# Patient Record
Sex: FEMALE | Race: BLACK OR AFRICAN AMERICAN | NOT HISPANIC OR LATINO | Employment: FULL TIME | URBAN - NONMETROPOLITAN AREA
[De-identification: names, ages, dates, MRNs, and addresses within clinical notes are randomized per-mention and may not be internally consistent; named-entity substitution may affect disease eponyms.]

---

## 2024-08-06 ENCOUNTER — OFFICE VISIT (OUTPATIENT)
Dept: FAMILY MEDICINE | Facility: CLINIC | Age: 59
End: 2024-08-06
Payer: MEDICAID

## 2024-08-06 VITALS
HEIGHT: 63 IN | DIASTOLIC BLOOD PRESSURE: 88 MMHG | TEMPERATURE: 98 F | SYSTOLIC BLOOD PRESSURE: 170 MMHG | HEART RATE: 79 BPM | OXYGEN SATURATION: 98 % | BODY MASS INDEX: 29.41 KG/M2 | WEIGHT: 166 LBS

## 2024-08-06 DIAGNOSIS — J30.9 ALLERGIC RHINITIS, UNSPECIFIED SEASONALITY, UNSPECIFIED TRIGGER: ICD-10-CM

## 2024-08-06 DIAGNOSIS — H35.30 MACULAR DEGENERATION, UNSPECIFIED LATERALITY, UNSPECIFIED TYPE: ICD-10-CM

## 2024-08-06 DIAGNOSIS — L30.9 ECZEMA, UNSPECIFIED TYPE: ICD-10-CM

## 2024-08-06 DIAGNOSIS — E11.9 TYPE 2 DIABETES MELLITUS WITHOUT COMPLICATION, WITHOUT LONG-TERM CURRENT USE OF INSULIN: Primary | ICD-10-CM

## 2024-08-06 DIAGNOSIS — I10 PRIMARY HYPERTENSION: ICD-10-CM

## 2024-08-06 PROCEDURE — 3077F SYST BP >= 140 MM HG: CPT | Mod: CPTII,,, | Performed by: NURSE PRACTITIONER

## 2024-08-06 PROCEDURE — 1159F MED LIST DOCD IN RCRD: CPT | Mod: CPTII,,, | Performed by: NURSE PRACTITIONER

## 2024-08-06 PROCEDURE — 3008F BODY MASS INDEX DOCD: CPT | Mod: CPTII,,, | Performed by: NURSE PRACTITIONER

## 2024-08-06 PROCEDURE — 3079F DIAST BP 80-89 MM HG: CPT | Mod: CPTII,,, | Performed by: NURSE PRACTITIONER

## 2024-08-06 PROCEDURE — 99204 OFFICE O/P NEW MOD 45 MIN: CPT | Mod: ,,, | Performed by: NURSE PRACTITIONER

## 2024-08-06 PROCEDURE — 1160F RVW MEDS BY RX/DR IN RCRD: CPT | Mod: CPTII,,, | Performed by: NURSE PRACTITIONER

## 2024-08-06 RX ORDER — TRIAMCINOLONE ACETONIDE 1 MG/G
CREAM TOPICAL 2 TIMES DAILY
Qty: 30 G | Refills: 1 | Status: SHIPPED | OUTPATIENT
Start: 2024-08-06

## 2024-08-06 RX ORDER — LABETALOL 200 MG/1
TABLET, FILM COATED ORAL
COMMUNITY
Start: 2024-06-13

## 2024-08-06 RX ORDER — LANCETS
EACH MISCELLANEOUS
Qty: 100 EACH | Refills: 11 | Status: SHIPPED | OUTPATIENT
Start: 2024-08-06

## 2024-08-06 RX ORDER — IRBESARTAN AND HYDROCHLOROTHIAZIDE 150; 12.5 MG/1; MG/1
1 TABLET, FILM COATED ORAL DAILY
Qty: 90 TABLET | Refills: 3 | Status: SHIPPED | OUTPATIENT
Start: 2024-08-06 | End: 2025-08-06

## 2024-08-06 RX ORDER — LOSARTAN POTASSIUM AND HYDROCHLOROTHIAZIDE 25; 100 MG/1; MG/1
TABLET ORAL
COMMUNITY
Start: 2024-06-13 | End: 2024-08-06 | Stop reason: ALTCHOICE

## 2024-08-06 RX ORDER — INSULIN PUMP SYRINGE, 3 ML
EACH MISCELLANEOUS
Qty: 1 EACH | Refills: 0 | Status: SHIPPED | OUTPATIENT
Start: 2024-08-06

## 2024-08-06 RX ORDER — METFORMIN HYDROCHLORIDE 1000 MG/1
TABLET ORAL
COMMUNITY
Start: 2024-06-13 | End: 2024-08-06 | Stop reason: ALTCHOICE

## 2024-08-06 RX ORDER — FLUTICASONE PROPIONATE 50 MCG
1 SPRAY, SUSPENSION (ML) NASAL
COMMUNITY
Start: 2024-04-30

## 2024-08-06 RX ORDER — ROSUVASTATIN CALCIUM 10 MG/1
10 TABLET, COATED ORAL DAILY
Qty: 90 TABLET | Refills: 3 | Status: SHIPPED | OUTPATIENT
Start: 2024-08-06 | End: 2025-08-06

## 2024-08-06 RX ORDER — CETIRIZINE HYDROCHLORIDE 10 MG/1
10 TABLET ORAL DAILY
Qty: 90 TABLET | Refills: 3 | Status: SHIPPED | OUTPATIENT
Start: 2024-08-06

## 2024-08-09 LAB
LEFT EYE DM RETINOPATHY: POSITIVE
RIGHT EYE DM RETINOPATHY: POSITIVE

## 2024-08-20 ENCOUNTER — LAB VISIT (OUTPATIENT)
Dept: LAB | Facility: HOSPITAL | Age: 59
End: 2024-08-20
Attending: NURSE PRACTITIONER
Payer: MEDICAID

## 2024-08-20 DIAGNOSIS — E11.9 TYPE 2 DIABETES MELLITUS WITHOUT COMPLICATION, WITHOUT LONG-TERM CURRENT USE OF INSULIN: ICD-10-CM

## 2024-08-20 LAB
25(OH)D3+25(OH)D2 SERPL-MCNC: 22 NG/ML (ref 30–80)
ALBUMIN SERPL-MCNC: 4.3 G/DL (ref 3.4–5)
ALBUMIN/GLOB SERPL: 1.5 RATIO
ALP SERPL-CCNC: 76 UNIT/L (ref 50–144)
ALT SERPL-CCNC: 20 UNIT/L (ref 1–45)
ANION GAP SERPL CALC-SCNC: 9 MEQ/L (ref 2–13)
AST SERPL-CCNC: 23 UNIT/L (ref 14–36)
BASOPHILS # BLD AUTO: 0.02 X10(3)/MCL (ref 0.01–0.08)
BASOPHILS NFR BLD AUTO: 0.3 % (ref 0.1–1.2)
BILIRUB SERPL-MCNC: 0.4 MG/DL (ref 0–1)
BUN SERPL-MCNC: 18 MG/DL (ref 7–20)
CALCIUM SERPL-MCNC: 10 MG/DL (ref 8.4–10.2)
CHLORIDE SERPL-SCNC: 110 MMOL/L (ref 98–110)
CHOLEST SERPL-MCNC: 181 MG/DL (ref 0–200)
CO2 SERPL-SCNC: 24 MMOL/L (ref 21–32)
CREAT SERPL-MCNC: 1.19 MG/DL (ref 0.66–1.25)
CREAT/UREA NIT SERPL: 15 (ref 12–20)
EOSINOPHIL # BLD AUTO: 0.27 X10(3)/MCL (ref 0.04–0.36)
EOSINOPHIL NFR BLD AUTO: 4.1 % (ref 0.7–7)
ERYTHROCYTE [DISTWIDTH] IN BLOOD BY AUTOMATED COUNT: 12.1 % (ref 11–14.5)
EST. AVERAGE GLUCOSE BLD GHB EST-MCNC: 182.9 MG/DL (ref 70–115)
GFR SERPLBLD CREATININE-BSD FMLA CKD-EPI: 53 ML/MIN/1.73/M2
GLOBULIN SER-MCNC: 2.8 GM/DL (ref 2–3.9)
GLUCOSE SERPL-MCNC: 183 MG/DL (ref 70–115)
HBA1C MFR BLD: 8 % (ref 4–6)
HCT VFR BLD AUTO: 37 % (ref 36–48)
HCV AB SERPL QL IA: NONREACTIVE
HDLC SERPL-MCNC: 46 MG/DL (ref 40–60)
HGB BLD-MCNC: 13.4 G/DL (ref 11.8–16)
HIV 1+2 AB+HIV1 P24 AG SERPL QL IA: NONREACTIVE
IMM GRANULOCYTES # BLD AUTO: 0.02 X10(3)/MCL (ref 0–0.03)
IMM GRANULOCYTES NFR BLD AUTO: 0.3 % (ref 0–0.5)
LDLC SERPL DIRECT ASSAY-SCNC: 104.4 MG/DL (ref 30–100)
LYMPHOCYTES # BLD AUTO: 2.24 X10(3)/MCL (ref 1.16–3.74)
LYMPHOCYTES NFR BLD AUTO: 33.9 % (ref 20–55)
MCH RBC QN AUTO: 30.9 PG (ref 27–34)
MCHC RBC AUTO-ENTMCNC: 36.2 G/DL (ref 31–37)
MCV RBC AUTO: 85.5 FL (ref 79–99)
MONOCYTES # BLD AUTO: 0.59 X10(3)/MCL (ref 0.24–0.36)
MONOCYTES NFR BLD AUTO: 8.9 % (ref 4.7–12.5)
NEUTROPHILS # BLD AUTO: 3.46 X10(3)/MCL (ref 1.56–6.13)
NEUTROPHILS NFR BLD AUTO: 52.5 % (ref 37–73)
NRBC BLD AUTO-RTO: 0 %
PLATELET # BLD AUTO: 297 X10(3)/MCL (ref 140–371)
PMV BLD AUTO: 9.7 FL (ref 9.4–12.4)
POTASSIUM SERPL-SCNC: 3.6 MMOL/L (ref 3.5–5.1)
PROT SERPL-MCNC: 7.1 GM/DL (ref 6.3–8.2)
RBC # BLD AUTO: 4.33 X10(6)/MCL (ref 4–5.1)
SODIUM SERPL-SCNC: 143 MMOL/L (ref 136–145)
TRIGL SERPL-MCNC: 96 MG/DL (ref 30–200)
TSH SERPL-ACNC: 1.61 UIU/ML (ref 0.36–3.74)
WBC # BLD AUTO: 6.6 X10(3)/MCL (ref 4–11.5)

## 2024-08-20 PROCEDURE — 80061 LIPID PANEL: CPT

## 2024-08-20 PROCEDURE — 82306 VITAMIN D 25 HYDROXY: CPT

## 2024-08-20 PROCEDURE — 87389 HIV-1 AG W/HIV-1&-2 AB AG IA: CPT

## 2024-08-20 PROCEDURE — 36415 COLL VENOUS BLD VENIPUNCTURE: CPT

## 2024-08-20 PROCEDURE — 85025 COMPLETE CBC W/AUTO DIFF WBC: CPT

## 2024-08-20 PROCEDURE — 83036 HEMOGLOBIN GLYCOSYLATED A1C: CPT

## 2024-08-20 PROCEDURE — 80053 COMPREHEN METABOLIC PANEL: CPT

## 2024-08-20 PROCEDURE — 84443 ASSAY THYROID STIM HORMONE: CPT

## 2024-08-20 PROCEDURE — 86803 HEPATITIS C AB TEST: CPT

## 2024-08-30 ENCOUNTER — PATIENT OUTREACH (OUTPATIENT)
Facility: CLINIC | Age: 59
End: 2024-08-30
Payer: COMMERCIAL

## 2024-08-30 ENCOUNTER — OFFICE VISIT (OUTPATIENT)
Dept: FAMILY MEDICINE | Facility: CLINIC | Age: 59
End: 2024-08-30
Payer: MEDICAID

## 2024-08-30 VITALS
HEIGHT: 62 IN | DIASTOLIC BLOOD PRESSURE: 80 MMHG | OXYGEN SATURATION: 97 % | HEART RATE: 80 BPM | BODY MASS INDEX: 30.18 KG/M2 | SYSTOLIC BLOOD PRESSURE: 122 MMHG | TEMPERATURE: 98 F | WEIGHT: 164 LBS

## 2024-08-30 DIAGNOSIS — E11.9 TYPE 2 DIABETES MELLITUS WITHOUT COMPLICATION, WITHOUT LONG-TERM CURRENT USE OF INSULIN: Primary | ICD-10-CM

## 2024-08-30 DIAGNOSIS — Z12.4 SCREENING FOR CERVICAL CANCER: ICD-10-CM

## 2024-08-30 DIAGNOSIS — G47.00 INSOMNIA, UNSPECIFIED TYPE: ICD-10-CM

## 2024-08-30 DIAGNOSIS — E78.2 MIXED HYPERLIPIDEMIA: ICD-10-CM

## 2024-08-30 DIAGNOSIS — Z12.31 SCREENING MAMMOGRAM FOR BREAST CANCER: ICD-10-CM

## 2024-08-30 DIAGNOSIS — I10 PRIMARY HYPERTENSION: ICD-10-CM

## 2024-08-30 PROBLEM — E78.5 HYPERLIPIDEMIA: Status: ACTIVE | Noted: 2024-08-30

## 2024-08-30 LAB
CREAT UR-MCNC: 70.7 MG/DL (ref 45–106)
MICROALBUMIN UR-MCNC: 38.1 UG/ML
MICROALBUMIN/CREAT RATIO PNL UR: 53.9 MG/GM CR (ref 0–30)

## 2024-08-30 PROCEDURE — 82570 ASSAY OF URINE CREATININE: CPT | Performed by: NURSE PRACTITIONER

## 2024-08-30 RX ORDER — IRBESARTAN 150 MG/1
300 TABLET ORAL DAILY
Qty: 180 TABLET | Refills: 3 | Status: SHIPPED | OUTPATIENT
Start: 2024-08-30

## 2024-08-30 RX ORDER — TEMAZEPAM 15 MG/1
15 CAPSULE ORAL NIGHTLY PRN
Qty: 30 CAPSULE | Refills: 2 | Status: SHIPPED | OUTPATIENT
Start: 2024-08-30

## 2024-08-30 RX ORDER — SEMAGLUTIDE 1.34 MG/ML
INJECTION, SOLUTION SUBCUTANEOUS
Qty: 2 EACH | Refills: 0 | Status: SHIPPED | OUTPATIENT
Start: 2024-08-30 | End: 2025-09-27

## 2024-08-30 NOTE — ASSESSMENT & PLAN NOTE
Lab Results   Component Value Date    HGBA1C 8.0 (H) 08/20/2024    HGBA1C 7.5 (H) 09/22/2023    LDLDIRECT 104.4 (H) 08/20/2024    CREATININE 1.19 08/20/2024   Continue Jardiance 25 mg.  Start Ozempic 0.25 mg weekly, increase to 0.5 mg after 1 month.   Encouraged to follow ADA dietary guidelines for eating and implement exercise into daily regimen.  Encouraged compliance with both medications and diet to limit long term and negative effects from the disease.    Monitory glucose levels each morning and record.  Please bring for review at next appointment.  Repeat labs in 3 months return to clinic.

## 2024-08-30 NOTE — PROGRESS NOTES
Patient ID: [id number]     Chief Complaint: Hypertension (With labs)      HPI:     Ofelia Abad is a 58 y.o. female in the office for Hypertension (With labs)  Results reviewed and discussed.  Records not yet received from Cardiology or previous PCP for review. Blood sugars running high at home, over 200 fasting. Urinating a lot, can't make it to the bathroom in time since starting jardiance and hctz. Denies swelling.  No CP or SOB.       Past Medical History:  has a past medical history of Diabetes mellitus, type 2.    Social History:  reports that she has never smoked. She has never used smokeless tobacco.    Current Outpatient Medications   Medication Instructions    blood sugar diagnostic Strp To check BG 1 times daily, to use with insurance preferred meter    blood-glucose meter kit To check BG 1 times daily, to use with insurance preferred meter    cetirizine (ZYRTEC) 10 mg, Oral, Daily    empagliflozin (JARDIANCE) 25 mg, Oral, Daily    fluticasone propionate (FLONASE) 50 mcg/actuation nasal spray 1 spray, Each Nostril, As needed (PRN)    irbesartan (AVAPRO) 300 mg, Oral, Daily    labetaloL (NORMODYNE) 200 MG tablet     lancets Misc To check BG 1 times daily, to use with insurance preferred meter    rosuvastatin (CRESTOR) 10 mg, Oral, Daily    semaglutide (OZEMPIC) 0.25 mg or 0.5 mg(2 mg/1.5 mL) pen injector Inject 0.25 mg into the skin every 7 days for 28 days, THEN 0.5 mg every 7 days. Start with 0.25 mg dose once weekly x 1 month, then increase to 0.5 mg dose once weekly thereafter.    temazepam (RESTORIL) 15 mg, Oral, Nightly PRN    triamcinolone acetonide 0.1% (KENALOG) 0.1 % cream Topical (Top), 2 times daily       Patient has No Known Allergies.     Patient Care Team:  Dina Dexter FNP-C as PCP - General (Family Medicine)  No, Primary Doctor  Care, [facility]     Subjective     Review of Systems    See HPI     Objective     Visit Vitals  /80   Pulse 80   Temp 98.1 °F (36.7 °C)  "(Temporal)   Ht 5' 2" (1.575 m)   Wt 74.4 kg (164 lb)   SpO2 97%   BMI 30.00 kg/m²       Physical Exam  Vitals reviewed.   Constitutional:       General: She is not in acute distress.     Appearance: Normal appearance. She is not ill-appearing.   Neck:      Vascular: No carotid bruit.   Cardiovascular:      Rate and Rhythm: Normal rate and regular rhythm.      Heart sounds: Murmur heard.   Pulmonary:      Effort: Pulmonary effort is normal.      Breath sounds: Normal breath sounds.   Musculoskeletal:      Right lower leg: No edema.      Left lower leg: No edema.   Lymphadenopathy:      Cervical: No cervical adenopathy.   Skin:     General: Skin is warm and dry.      Capillary Refill: Capillary refill takes less than 2 seconds.   Neurological:      Mental Status: She is alert and oriented to person, place, and time. Mental status is at baseline.   Psychiatric:         Mood and Affect: Mood normal.         Behavior: Behavior normal.         Thought Content: Thought content normal.         Assessment & Plan:     1. Type 2 diabetes mellitus without complication, without long-term current use of insulin  Overview:  On jardiance 25 mg    Assessment & Plan:  Lab Results   Component Value Date    HGBA1C 8.0 (H) 08/20/2024    HGBA1C 7.5 (H) 09/22/2023    LDLDIRECT 104.4 (H) 08/20/2024    CREATININE 1.19 08/20/2024   Continue Jardiance 25 mg.  Start Ozempic 0.25 mg weekly, increase to 0.5 mg after 1 month.   Encouraged to follow ADA dietary guidelines for eating and implement exercise into daily regimen.  Encouraged compliance with both medications and diet to limit long term and negative effects from the disease.    Monitory glucose levels each morning and record.  Please bring for review at next appointment.  Repeat labs in 3 months return to clinic.         Orders:  -     Microalbumin/Creatinine Ratio, Urine  -     semaglutide (OZEMPIC) 0.25 mg or 0.5 mg(2 mg/1.5 mL) pen injector; Inject 0.25 mg into the skin every 7 days " for 28 days, THEN 0.5 mg every 7 days. Start with 0.25 mg dose once weekly x 1 month, then increase to 0.5 mg dose once weekly thereafter.  Dispense: 2 each; Refill: 0    2. Screening mammogram for breast cancer  -     Mammo Digital Screening Bilat w/ [facility]; Future; Expected date: 08/30/2024    3. Screening for cervical cancer  -     Ambulatory referral/consult to Gynecology; Future; Expected date: 09/06/2024    4. Primary hypertension  Overview:  On irbesartan/HCTZ    Assessment & Plan:  Blood pressure at goal, complaining of urinary frequency and muscle cramps.  Stop HCTZ.  Continue irbesartan 150 mg, if blood pressure consistently over 130/90, increase irbesartan to 300 mg a day and notify office.  Follow up in about 2 months    Orders:  -     irbesartan (AVAPRO) 150 MG tablet; Take 2 tablets (300 mg total) by mouth once daily.  Dispense: 180 tablet; Refill: 3    5. Insomnia, unspecified type  -     temazepam (RESTORIL) 15 mg Cap; Take 1 capsule (15 mg total) by mouth nightly as needed (sleep).  Dispense: 30 capsule; Refill: 2    6. Mixed hyperlipidemia  Overview:  On rosuvastatin 10 mg    Assessment & Plan:  Lab Results   Component Value Date    CHOL 181 08/20/2024    HDL 46 08/20/2024    LDLDIRECT 104.4 (H) 08/20/2024    TRIG 96 08/20/2024         LDL Goal: less than 70   Educated on dietary modifications. Follow a low cholesterol, low saturated fat diet with less that 200mg of cholesterol a day.  Avoid fried foods and high saturated fats.  Increase dietary fiber.  Regular exercise can reduce LDL (bad cholesterol) and raise HDL (good cholesterol). Encourage physical activity 5 times per week for 30 minutes per day.              Follow up for 1), 3 mo f/u, fasting labs prior, DM, 2) 2 mo DM no labs. In addition to their next scheduled appointment, the patient has also been instructed to follow up on as needed basis.     Future Appointments   Date Time Provider Department Center   11/8/2024 11:30 AM Dina Dexter  [facility]   12/13/2024 10:00 AM Dina Dexter FNP-C [facility]

## 2024-08-30 NOTE — PROGRESS NOTES
Health Maintenance Topic(s) Outreach Outcomes & Actions Taken:    Eye Exam - Outreach Outcomes & Actions Taken  : Hyperlinked eye exam from [facility]

## 2024-08-30 NOTE — ASSESSMENT & PLAN NOTE
Blood pressure at goal, complaining of urinary frequency and muscle cramps.  Stop HCTZ.  Continue irbesartan 150 mg, if blood pressure consistently over 130/90, increase irbesartan to 300 mg a day and notify office.  Follow up in about 2 months

## 2024-08-30 NOTE — ASSESSMENT & PLAN NOTE
Lab Results   Component Value Date    CHOL 181 08/20/2024    HDL 46 08/20/2024    LDLDIRECT 104.4 (H) 08/20/2024    TRIG 96 08/20/2024         LDL Goal: less than 70   Educated on dietary modifications. Follow a low cholesterol, low saturated fat diet with less that 200mg of cholesterol a day.  Avoid fried foods and high saturated fats.  Increase dietary fiber.  Regular exercise can reduce LDL (bad cholesterol) and raise HDL (good cholesterol). Encourage physical activity 5 times per week for 30 minutes per day.

## 2024-09-04 ENCOUNTER — HOSPITAL ENCOUNTER (OUTPATIENT)
Dept: RADIOLOGY | Facility: HOSPITAL | Age: 59
Discharge: HOME | End: 2024-09-04
Attending: NURSE PRACTITIONER
Payer: MEDICAID

## 2024-09-04 DIAGNOSIS — Z12.31 SCREENING MAMMOGRAM FOR BREAST CANCER: ICD-10-CM

## 2024-09-04 PROCEDURE — 77067 SCR MAMMO BI INCL CAD: CPT | Mod: TC

## 2024-09-12 ENCOUNTER — TELEPHONE (OUTPATIENT)
Dept: FAMILY MEDICINE | Facility: CLINIC | Age: 59
End: 2024-09-12
Payer: MEDICAID

## 2024-09-12 NOTE — TELEPHONE ENCOUNTER
----- Message from Edna Mehta LPN sent at 8/30/2024  9:09 AM CDT -----  Regarding: cardi  Call dr guardado and get visits [phone]

## 2024-09-12 NOTE — TELEPHONE ENCOUNTER
Spoke with his office all they have is an echo from aug, they never seen her in office, but they will send it .

## 2024-09-26 ENCOUNTER — OFFICE VISIT (OUTPATIENT)
Dept: OBSTETRICS AND GYNECOLOGY | Facility: CLINIC | Age: 59
End: 2024-09-26
Payer: MEDICAID

## 2024-09-26 ENCOUNTER — TELEPHONE (OUTPATIENT)
Dept: FAMILY MEDICINE | Facility: CLINIC | Age: 59
End: 2024-09-26
Payer: MEDICAID

## 2024-09-26 VITALS
SYSTOLIC BLOOD PRESSURE: 148 MMHG | WEIGHT: 160 LBS | DIASTOLIC BLOOD PRESSURE: 92 MMHG | BODY MASS INDEX: 29.44 KG/M2 | TEMPERATURE: 98 F | HEIGHT: 62 IN

## 2024-09-26 DIAGNOSIS — Z12.4 SCREENING FOR CERVICAL CANCER: ICD-10-CM

## 2024-09-26 PROCEDURE — 99499 UNLISTED E&M SERVICE: CPT | Mod: ,,, | Performed by: OBSTETRICS & GYNECOLOGY

## 2024-09-26 RX ORDER — KETOROLAC TROMETHAMINE 5 MG/ML
1 SOLUTION OPHTHALMIC 2 TIMES DAILY
COMMUNITY
Start: 2024-09-03

## 2024-09-26 NOTE — TELEPHONE ENCOUNTER
"Dina's note from 8/30/24 states "Continue irbesartan 150 mg, if blood pressure consistently over 130/90, increase irbesartan to 300 mg a day and notify office." I called pt. She said that she is taking 1 tablet and her bp Monday was 170/110 before she took it. After taking it, it was 142/90. I gave her Dina's instructions and advised her to monitor her bp every day for a couple of weeks, report if consistently greater than 130/90 or if it too low and she feels bad. She verbalized understanding.   "

## 2024-10-09 ENCOUNTER — TELEPHONE (OUTPATIENT)
Dept: FAMILY MEDICINE | Facility: CLINIC | Age: 59
End: 2024-10-09
Payer: MEDICAID

## 2024-10-09 DIAGNOSIS — E78.2 MIXED HYPERLIPIDEMIA: ICD-10-CM

## 2024-10-09 DIAGNOSIS — E11.9 TYPE 2 DIABETES MELLITUS WITHOUT COMPLICATION, WITHOUT LONG-TERM CURRENT USE OF INSULIN: Primary | ICD-10-CM

## 2024-10-09 DIAGNOSIS — I10 PRIMARY HYPERTENSION: ICD-10-CM

## 2024-10-09 PROCEDURE — 85025 COMPLETE CBC W/AUTO DIFF WBC: CPT | Performed by: NURSE PRACTITIONER

## 2024-10-09 PROCEDURE — 83036 HEMOGLOBIN GLYCOSYLATED A1C: CPT | Performed by: NURSE PRACTITIONER

## 2024-10-09 PROCEDURE — 80053 COMPREHEN METABOLIC PANEL: CPT | Performed by: NURSE PRACTITIONER

## 2024-10-09 PROCEDURE — 80061 LIPID PANEL: CPT | Performed by: NURSE PRACTITIONER

## 2024-10-09 NOTE — TELEPHONE ENCOUNTER
Spoke with pt. She will come in this afternoon to get labs done and will schedule appt for tomorrow.   [Normal] : normal gait, coordination grossly intact, no focal deficits

## 2024-10-09 NOTE — TELEPHONE ENCOUNTER
This is my last visit note: Fasting labs prior to follow up in about 2-3 weeks. She needs to be seen. If she can do labs today, I'll see her tomorrow.

## 2024-10-10 ENCOUNTER — OFFICE VISIT (OUTPATIENT)
Dept: FAMILY MEDICINE | Facility: CLINIC | Age: 59
End: 2024-10-10
Payer: MEDICAID

## 2024-10-10 ENCOUNTER — TELEPHONE (OUTPATIENT)
Dept: FAMILY MEDICINE | Facility: CLINIC | Age: 59
End: 2024-10-10

## 2024-10-10 VITALS
DIASTOLIC BLOOD PRESSURE: 84 MMHG | OXYGEN SATURATION: 98 % | SYSTOLIC BLOOD PRESSURE: 150 MMHG | HEART RATE: 81 BPM | HEIGHT: 62 IN | BODY MASS INDEX: 28.93 KG/M2 | WEIGHT: 157.19 LBS | TEMPERATURE: 97 F

## 2024-10-10 DIAGNOSIS — E11.9 TYPE 2 DIABETES MELLITUS WITHOUT COMPLICATION, WITHOUT LONG-TERM CURRENT USE OF INSULIN: Primary | ICD-10-CM

## 2024-10-10 DIAGNOSIS — E78.2 MIXED HYPERLIPIDEMIA: ICD-10-CM

## 2024-10-10 DIAGNOSIS — I10 PRIMARY HYPERTENSION: ICD-10-CM

## 2024-10-10 RX ORDER — AMLODIPINE BESYLATE 5 MG/1
5 TABLET ORAL DAILY
Qty: 90 TABLET | Refills: 3 | Status: SHIPPED | OUTPATIENT
Start: 2024-10-10 | End: 2025-10-10

## 2024-10-10 RX ORDER — SEMAGLUTIDE 0.68 MG/ML
0.5 INJECTION, SOLUTION SUBCUTANEOUS
Qty: 9 ML | Refills: 3 | Status: SHIPPED | OUTPATIENT
Start: 2024-10-10

## 2024-10-10 RX ORDER — ROSUVASTATIN CALCIUM 10 MG/1
10 TABLET, COATED ORAL NIGHTLY
Qty: 90 TABLET | Refills: 3 | Status: SHIPPED | OUTPATIENT
Start: 2024-10-10

## 2024-10-10 NOTE — ASSESSMENT & PLAN NOTE
Blood pressure not at goal.  Continue irbesartan 300 mg bid, losartan to 200 mg bid. Start amlodipine 5 mg.

## 2024-10-10 NOTE — PROGRESS NOTES
Patient ID: [id number]     Chief Complaint: Results (Labs results)      HPI:     Ofelia Abad is a 58 y.o. female here today for Results (Labs results)  Feels well, blood pressure is still high.  She's scheduled for pap with Dr. Duc ross.     Past Medical History:  has a past medical history of Diabetes mellitus, type 2.    Surgical History:  has a past surgical history that includes Breast biopsy (Right); Tumor removal (1978); Tubal ligation; and Tubal Reversal .    Family History: family history includes Breast cancer in her maternal aunt and maternal grandmother; Cervical cancer in her mother; Diabetes in her father and mother.    Social History:  reports that she has never smoked. She has never used smokeless tobacco. She reports that she does not currently use alcohol. She reports that she does not use drugs.    Current Outpatient Medications   Medication Instructions    amLODIPine (NORVASC) 5 mg, Oral, Daily    blood sugar diagnostic Strp To check BG 1 times daily, to use with insurance preferred meter    blood-glucose meter kit To check BG 1 times daily, to use with insurance preferred meter    cetirizine (ZYRTEC) 10 mg, Oral, Daily    empagliflozin (JARDIANCE) 25 mg, Oral, Daily    fluticasone propionate (FLONASE) 50 mcg/actuation nasal spray 1 spray, As needed (PRN)    irbesartan (AVAPRO) 300 mg, Oral, Daily    ketorolac 0.5% (ACULAR) 0.5 % Drop 1 drop, 2 times daily    labetaloL (NORMODYNE) 200 MG tablet     lancets Misc To check BG 1 times daily, to use with insurance preferred meter    OZEMPIC 0.5 mg, Subcutaneous, Every 7 days    rosuvastatin (CRESTOR) 10 mg, Oral, Nightly    temazepam (RESTORIL) 15 mg, Oral, Nightly PRN    triamcinolone acetonide 0.1% (KENALOG) 0.1 % cream Topical (Top), 2 times daily       Patient has No Known Allergies.     Patient Care Team:  Dina Dexter FNP-C as PCP - General (Family Medicine)  No, Primary Doctor  Care, [facility]       Subjective:     Review of  "Systems    See HPI     Objective:     Visit Vitals  BP (!) 150/84   Pulse 81   Temp 97.3 °F (36.3 °C) (Temporal)   Ht 5' 2.01" (1.575 m)   Wt 71.3 kg (157 lb 3.2 oz)   SpO2 98%   BMI 28.74 kg/m²       Physical Exam  Vitals reviewed.   Constitutional:       General: She is not in acute distress.  Cardiovascular:      Rate and Rhythm: Normal rate and regular rhythm.   Pulmonary:      Effort: Pulmonary effort is normal.      Breath sounds: Normal breath sounds.   Skin:     General: Skin is warm and dry.   Neurological:      Mental Status: She is alert and oriented to person, place, and time.   Psychiatric:         Mood and Affect: Mood normal.         Labs Reviewed:     Chemistry:  Lab Results   Component Value Date     10/09/2024    K 4.1 10/09/2024    BUN 21 (H) 10/09/2024    CREATININE 1.21 10/09/2024    EGFRNORACEVR 52 10/09/2024    GLUCOSE 112 10/09/2024    CALCIUM 10.2 10/09/2024    ALKPHOS 77 10/09/2024    LABPROT 7.5 10/09/2024    ALBUMIN 4.8 10/09/2024    AST 28 10/09/2024    ALT 21 10/09/2024    DEQCHESN33YA 22 (L) 08/20/2024    TSH 1.610 08/20/2024        Lab Results   Component Value Date    HGBA1C 7.4 (H) 10/09/2024        Hematology:  Lab Results   Component Value Date    WBC 6.63 10/09/2024    RBC 4.46 10/09/2024    HGB 13.7 10/09/2024    HCT 37.9 10/09/2024    MCV 85.0 10/09/2024    MCH 30.7 10/09/2024    MCHC 36.1 10/09/2024    RDW 12.0 10/09/2024     10/09/2024    MPV 9.8 10/09/2024       Lipid Panel:  Lab Results   Component Value Date    CHOL 161 10/09/2024    HDL 46 10/09/2024    LDLDIRECT 88.0 10/09/2024    TRIG 89 10/09/2024         Assessment & Plan:     1. Type 2 diabetes mellitus without complication, without long-term current use of insulin  Overview:  On jardiance 25 mg, ozempic 0.5 mg    Assessment & Plan:  Lab Results   Component Value Date    HGBA1C 7.4 (H) 10/09/2024      Continue medications without change.   Encouraged to follow ADA dietary guidelines for eating and " implement exercise into daily regimen.  Encouraged compliance with both medications and diet to limit long term and negative effects from the disease.    Monitory glucose levels each morning and record.  Please bring for review at next appointment.      Orders:  -     semaglutide (OZEMPIC) 0.25 mg or 0.5 mg (2 mg/3 mL) pen injector; Inject 0.5 mg into the skin every 7 days.  Dispense: 9 mL; Refill: 3  -     rosuvastatin (CRESTOR) 10 MG tablet; Take 1 tablet (10 mg total) by mouth every evening.  Dispense: 90 tablet; Refill: 3    2. Primary hypertension  Overview:  On irbesartan/HCTZ    Assessment & Plan:  Blood pressure not at goal.  Continue irbesartan 300 mg bid, losartan to 200 mg bid. Start amlodipine 5 mg.     Orders:  -     amLODIPine (NORVASC) 5 MG tablet; Take 1 tablet (5 mg total) by mouth once daily.  Dispense: 90 tablet; Refill: 3    3. Mixed hyperlipidemia  Overview:  On rosuvastatin 10 mg    Assessment & Plan:  LDL not to goal, increase rosuvastatin.    Orders:  -     rosuvastatin (CRESTOR) 10 MG tablet; Take 1 tablet (10 mg total) by mouth every evening.  Dispense: 90 tablet; Refill: 3       Follow up for 1) 1 mo f/u BP. In addition to their scheduled follow up, the patient has also been instructed to follow up on as needed basis.     Future Appointments   Date Time Provider Department Center   10/15/2024 10:30 AM Pinky Xavier MD [facility]   11/8/2024 11:30 AM Dina Dexter FNP-C [facility]   12/13/2024 10:00 AM Dina Dexter FNP-C [facility]

## 2024-10-10 NOTE — ASSESSMENT & PLAN NOTE
Lab Results   Component Value Date    HGBA1C 7.4 (H) 10/09/2024      Continue medications without change.   Encouraged to follow ADA dietary guidelines for eating and implement exercise into daily regimen.  Encouraged compliance with both medications and diet to limit long term and negative effects from the disease.    Monitory glucose levels each morning and record.  Please bring for review at next appointment.

## 2024-10-11 ENCOUNTER — TELEPHONE (OUTPATIENT)
Dept: FAMILY MEDICINE | Facility: CLINIC | Age: 59
End: 2024-10-11
Payer: MEDICAID

## 2024-10-11 DIAGNOSIS — I10 PRIMARY HYPERTENSION: Primary | ICD-10-CM

## 2024-10-14 RX ORDER — NIFEDIPINE 30 MG/1
30 TABLET, EXTENDED RELEASE ORAL DAILY
Qty: 30 TABLET | Refills: 11 | Status: SHIPPED | OUTPATIENT
Start: 2024-10-14 | End: 2025-10-14

## 2024-10-14 NOTE — TELEPHONE ENCOUNTER
The only thing that happened when she took amlodipine is that her ankles got swollen?  If yes, this is not an allergy.  Start nifedipine 30 mg daily.

## 2024-10-14 NOTE — PROGRESS NOTES
Chief Complaint:  Well Woman    History of Present Illness:  Ofelia Abad is a 58 y.o. year old  presents for her well woman exam. Menopausal, she denies any postmenopausal bleeding. C/o hot flashes, no improvement with fans or layered clothing.    PMH of hypertension. Currently taking Labetalol 200 mg. Recently started a new medication Friday, took first pill today.         Gyn History:  Menstrual History  Cycle: No  Menarche Age: 15 years  No Cycle Reason: Other  Other Reason: +Postmenopausal    Menopause  Menopause Age: 42 years  Post Menopausal Bleeding: No  Hormone Replacement Therapy: No    Pap History  Last pap date: 17  Result: Normal  History of Abnormal Pap: No  HPV Vaccine Completed: N/a    [place]  Sexually Active: No  STI History: No  Contraception: N/a    Breast History  Last Breast Imaging Date: Yes  Date: 24 (MMG - Benign)  History of Abnormal Breast Imaging : No  History of Breast Biopsy: Yes  Date of last biopsy:  ((R) Breast Bx)  Results of Last Biopsy: Yes ((R) Breast Bx - Benign)        Review of Systems:  General/Constitutional: Chills denies. Fatigue/weakness denies. Fever denies. Night sweats denies. Hot flashes admits    Respiratory: Cough denies. Hemoptysis denies. SOB denies. Sputum production denies. Wheezing denies .   Cardiovascular: Chest pain denies. Dizziness denies. Palpitations denies. Swelling in hands/feet denies.                Breast: Dimpling denies. Breast mass denies. Breast pain/tenderness denies. Nipple discharge denies. Puckering denies.  Gastrointestinal: Abdominal pain denies. Blood in stool denies. Constipation denies. Diarrhea denies. Heartburn denies. Nausea denies. Vomiting denies    Genitourinary: Incontinence denies. Blood in urine denies. Frequent urination denies. Painful urination denies. Urinary urgency denies. Nocturia denies    Gynecologic: Irregular menses denies. Heavy bleeding denies. Painful menses denies. Vaginal discharge  denies. Vaginal odor denies. Vaginal itching denies. Vaginal lesion denies. Pelvic pain denies. Decreased libido denies. Vulvar lesion denies. Prolapse of genital organs denies. Painful intercourse denies. Postcoital bleeding denies    Psychiatric: Depression denies. Anxiety denies.     OB History    Para Term  AB Living   2 2 2 0 0 2   SAB IAB Ectopic Multiple Live Births   0 0 0 0 2      # 1 - Date: [birth year]10[birth year], Sex: Female, Weight: 2.268 kg (5 lb), GA: 40w0d, Type: Vaginal, Spontaneous, Apgar1: None, Apgar5: None, Living: Living, Birth Comments: None    # 2 - Date: [birth year], Sex: Male, Weight: 2.268 kg (5 lb), GA: 40w0d, Type: Vaginal, Spontaneous, Apgar1: None, Apgar5: None, Living: Living, Birth Comments: None      Past Medical History:   Diagnosis Date    Diabetes mellitus, type 2     Fibroid     Heart murmur     Hyperlipidemia     Hypertension     Seasonal allergies     Unspecified macular degeneration        Current Outpatient Medications:     blood sugar diagnostic Strp, To check BG 1 times daily, to use with insurance preferred meter, Disp: 100 each, Rfl: 11    blood-glucose meter kit, To check BG 1 times daily, to use with insurance preferred meter, Disp: 1 each, Rfl: 0    cetirizine (ZYRTEC) 10 MG tablet, Take 1 tablet (10 mg total) by mouth once daily., Disp: 90 tablet, Rfl: 3    empagliflozin (JARDIANCE) 25 mg tablet, Take 1 tablet (25 mg total) by mouth once daily., Disp: 90 tablet, Rfl: 0    fluticasone propionate (FLONASE) 50 mcg/actuation nasal spray, 1 spray by Each Nostril route as needed., Disp: , Rfl:     irbesartan (AVAPRO) 150 MG tablet, Take 2 tablets (300 mg total) by mouth once daily., Disp: 180 tablet, Rfl: 3    ketorolac 0.5% (ACULAR) 0.5 % Drop, Place 1 drop into both eyes 2 (two) times daily., Disp: , Rfl:     labetaloL (NORMODYNE) 200 MG tablet, , Disp: , Rfl:     lancets [facility] [place] To check BG 1 times daily, to use with insurance preferred meter, Disp: 100 each,  Rfl: 11    NIFEdipine (ADALAT CC) 30 MG TbSR, Take 1 tablet (30 mg total) by mouth once daily., Disp: 30 tablet, Rfl: 11    rosuvastatin (CRESTOR) 10 MG tablet, Take 1 tablet (10 mg total) by mouth every evening., Disp: 90 tablet, Rfl: 3    semaglutide (OZEMPIC) 0.25 mg or 0.5 mg (2 mg/3 mL) pen injector, Inject 0.5 mg into the skin every 7 days., Disp: 9 mL, Rfl: 3    temazepam (RESTORIL) 15 mg Cap, Take 1 capsule (15 mg total) by mouth nightly as needed (sleep)., Disp: 30 capsule, Rfl: 2    triamcinolone acetonide 0.1% (KENALOG) 0.1 % cream, Apply topically 2 (two) times daily., Disp: 30 g, Rfl: 1    PARoxetine mesylate,menop.sym, (BRISDELLE) 7.5 mg Cap, Take 7.5 mg by mouth Daily., Disp: 30 capsule, Rfl: 1    Review of patient's allergies indicates:   Allergen Reactions    Norvasc [amlodipine] Swelling       Past Surgical History:   Procedure Laterality Date    ANTERIOR CRUCIATE LIGAMENT REPAIR Right 1989    BREAST BIOPSY Right 1981    BENIGN    TUBAL LIGATION      Tubal Reversal       TUMOR REMOVAL Right 1978    Breast     Family History   Problem Relation Name Age of Onset    Breast cancer Maternal Grandmother Alberta         40's    Diabetes Father Best     Hypertension Father Best     Ovarian cancer Mother Jamshid Kovacs         60's    Diabetes Mother Jamshid Kovacs     Cancer Mother Jamshid Kovacs     Thyroid disease Mother Jamshid Kovacs     Breast cancer Sister Pawel 44    Rashes / Skin problems Sister Pawel     Breast cancer Maternal Aunt Marcia         50's    Heart failure Maternal Aunt Marcia     Stroke Maternal Aunt Marcia     Colon cancer Neg Hx      Uterine cancer Neg Hx      Cervical cancer Neg Hx       Social History     Socioeconomic History    Marital status:    Tobacco Use    Smoking status: Never     Passive exposure: Never    Smokeless tobacco: Never   Substance and Sexual Activity    Alcohol use: Not Currently     Alcohol/week: 1.0 standard drink of alcohol      "Types: 1 Glasses of wine per week     Comment: Occasional    Drug use: Never    Sexual activity: Not Currently     Partners: Male     Birth control/protection: Abstinence, Post-menopausal     Comment: STI: None   Social History Narrative    ** Merged History Encounter **            Physical Exam:  BP (!) 178/84   Temp 97 °F (36.1 °C) (Temporal)   Ht 5' 1" (1.549 m)   Wt 71.2 kg (157 lb)   BMI 29.66 kg/m²     Chaperone: present.       General appearance: healthy, well-nourished and well-developed     Psychiatric: Orientation to time, place and person. Normal mood and affect and active, alert     Skin: Appearance: no rashes or lesions.     Neck:   Neck: supple, FROM, trachea midline. and no masses   Thyroid: no enlargement or nodules and non-tender.       Cardiovascular:   Auscultation: RRR and no murmur.   Peripheral Vascular: no varicosities, LLE edema, RLE edema, calf tenderness, and palpable cord and pedal pulses intact.     Lungs:   Respiratory effort: no intercostal retractions or accessory muscle usage.   Auscultation: no wheezing, rales/crackles, or rhonchi and clear to auscultation.     Breast:   Inspection/Palpation: no tenderness, discrete/distinct masses, skin changes, or abnormal secretions. Nipple appearance normal.     Abdomen:   Auscultation/Inspection/Palpation: no hepatomegaly, splenomegaly, masses, tenderness or CVA tenderness and soft, non-distended bowel sounds preset.    Hernia: no palpable hernias.     Female Genitalia:    Vulva: no masses, tenderness or lesions    Bladder/Urethra: no urethral discharge or mass, normal meatus, bladder non-distended.    Vagina: no tenderness, erythema, cystocele, rectocele, abnormal vaginal discharge or vesicle(s) or ulcers    Cervix: no discharge, no cervical lacerations noted or motion tenderness and grossly normal    Uterus: normal size and shape and midline, non-tender, and no uterine prolapse.    Adnexa/Parametria: no parametrial tenderness or mass, no " adnexal tenderness or ovarian mass.     Lymph Nodes:   Palpation: non tender submandibular nodes, axillary nodes, or inguinal nodes.     Rectal Exam:   Rectum: normal perianal skin.       Assessment/Plan:  1. Well woman exam  Pap   Recommend BSE monthly   Discussed recommendations of annual screening after age of 40 with mammogram  Explained that screening is not 100% reliable. Advised patient if she notices any changes to her breast including a lump, mass, dimpling, discharge, rash, or tenderness she should contact us immediately.     Healthy diet, exercise   MMG  Multivitamin   Seat belt   Sunscreen use   Safe sex/ STI education  Annual labs: w/ PCP Dina Dexter  Colonoscopy: cologuard 2024, negative    2. Hypertension  Educated  Currently taking Labetalol 200 and started new medication yesterday  ER precautions  Follow up with PCP    Vitals:    10/15/24 1104 10/15/24 1139   BP: (!) 170/80 (!) 178/84     3. Hot flashes  Patient with vasomotor symptoms of menopause that have not responded to conservative measures    Reviewed the physiologic roles of estrogen, progesterone and androgens in the female both positive and negative    Explained that with menopause comes a dramatic reduction in these hormones and that changes take place in their absence    Discussed symptoms of decreased hormone levels and that these symptoms usually last 6 months to 2 years    Reviewed hormone replacement options as well as indications and contraindications    Discussed the WHI study findings and current FDA recommendations. Also discussed current recommendations for breast screening       Discussed sexuality  Answered questions  Patient understands and wishes to  proceed with  Paroxetine 7.5 mg qd  Follow up 1mo          This note was transcribed by Cassia Edward MA. There may be transcription errors as a result, however minimal. I agree with transcription and every effort has been made to ensure accuracy of transcription, but any obvious  errors or omissions should be clarified with the author of the document.

## 2024-10-15 ENCOUNTER — TELEPHONE (OUTPATIENT)
Dept: OBSTETRICS AND GYNECOLOGY | Facility: CLINIC | Age: 59
End: 2024-10-15

## 2024-10-15 ENCOUNTER — OFFICE VISIT (OUTPATIENT)
Dept: OBSTETRICS AND GYNECOLOGY | Facility: CLINIC | Age: 59
End: 2024-10-15
Payer: MEDICAID

## 2024-10-15 VITALS
HEIGHT: 61 IN | WEIGHT: 157 LBS | SYSTOLIC BLOOD PRESSURE: 178 MMHG | TEMPERATURE: 97 F | DIASTOLIC BLOOD PRESSURE: 84 MMHG | BODY MASS INDEX: 29.64 KG/M2

## 2024-10-15 DIAGNOSIS — R23.2 HOT FLASHES: Primary | ICD-10-CM

## 2024-10-15 DIAGNOSIS — Z12.4 CERVICAL CANCER SCREENING: ICD-10-CM

## 2024-10-15 DIAGNOSIS — Z01.419 WELL WOMAN EXAM: Primary | ICD-10-CM

## 2024-10-15 DIAGNOSIS — I10 HYPERTENSION, UNSPECIFIED TYPE: ICD-10-CM

## 2024-10-15 DIAGNOSIS — R23.2 HOT FLASHES: ICD-10-CM

## 2024-10-15 PROCEDURE — 87624 HPV HI-RISK TYP POOLED RSLT: CPT | Performed by: OBSTETRICS & GYNECOLOGY

## 2024-10-15 RX ORDER — PAROXETINE 10 MG/1
10 TABLET, FILM COATED ORAL DAILY
Qty: 30 TABLET | Refills: 1 | Status: SHIPPED | OUTPATIENT
Start: 2024-10-15 | End: 2025-10-15

## 2024-10-15 RX ORDER — PAROXETINE 7.5 MG/1
7.5 CAPSULE ORAL DAILY
Qty: 30 CAPSULE | Refills: 1 | Status: SHIPPED | OUTPATIENT
Start: 2024-10-15 | End: 2024-10-15

## 2024-10-15 NOTE — TELEPHONE ENCOUNTER
----- Message from Elizabet sent at 10/15/2024  1:25 PM CDT -----  Regarding: Med change  Who Called:  [facility]  Best Call Back Number:  [phone]  Additional Information:  called in regards to meds not being covered under insurance for patient with that particular dx code-  Dr Xavier would have to lower the dosage in order for insurance to pay

## 2024-10-15 NOTE — TELEPHONE ENCOUNTER
Paroxetine 7.5 mg not on [address] program and is pretty expensive according to [facility] pharmacy.  Dr. Xavier notified.  Medication switched to Paroxetine 10 mg once daily and 1 refill.

## 2024-10-29 DIAGNOSIS — E11.9 TYPE 2 DIABETES MELLITUS WITHOUT COMPLICATION, WITHOUT LONG-TERM CURRENT USE OF INSULIN: ICD-10-CM

## 2024-10-29 RX ORDER — EMPAGLIFLOZIN 25 MG/1
25 TABLET, FILM COATED ORAL
Qty: 90 TABLET | Refills: 3 | Status: SHIPPED | OUTPATIENT
Start: 2024-10-29

## 2024-11-06 ENCOUNTER — OFFICE VISIT (OUTPATIENT)
Dept: FAMILY MEDICINE | Facility: CLINIC | Age: 59
End: 2024-11-06
Payer: MEDICAID

## 2024-11-06 VITALS
TEMPERATURE: 97 F | SYSTOLIC BLOOD PRESSURE: 132 MMHG | OXYGEN SATURATION: 98 % | WEIGHT: 155 LBS | HEIGHT: 61 IN | HEART RATE: 79 BPM | BODY MASS INDEX: 29.27 KG/M2 | DIASTOLIC BLOOD PRESSURE: 80 MMHG

## 2024-11-06 DIAGNOSIS — I10 PRIMARY HYPERTENSION: Primary | ICD-10-CM

## 2024-11-06 DIAGNOSIS — B37.31 CANDIDAL VAGINITIS: ICD-10-CM

## 2024-11-06 PROCEDURE — 1159F MED LIST DOCD IN RCRD: CPT | Mod: CPTII,,, | Performed by: NURSE PRACTITIONER

## 2024-11-06 PROCEDURE — 4010F ACE/ARB THERAPY RXD/TAKEN: CPT | Mod: CPTII,,, | Performed by: NURSE PRACTITIONER

## 2024-11-06 PROCEDURE — 3008F BODY MASS INDEX DOCD: CPT | Mod: CPTII,,, | Performed by: NURSE PRACTITIONER

## 2024-11-06 PROCEDURE — 3060F POS MICROALBUMINURIA REV: CPT | Mod: CPTII,,, | Performed by: NURSE PRACTITIONER

## 2024-11-06 PROCEDURE — 99214 OFFICE O/P EST MOD 30 MIN: CPT | Mod: ,,, | Performed by: NURSE PRACTITIONER

## 2024-11-06 PROCEDURE — 3066F NEPHROPATHY DOC TX: CPT | Mod: CPTII,,, | Performed by: NURSE PRACTITIONER

## 2024-11-06 PROCEDURE — 3075F SYST BP GE 130 - 139MM HG: CPT | Mod: CPTII,,, | Performed by: NURSE PRACTITIONER

## 2024-11-06 PROCEDURE — 3051F HG A1C>EQUAL 7.0%<8.0%: CPT | Mod: CPTII,,, | Performed by: NURSE PRACTITIONER

## 2024-11-06 PROCEDURE — 3079F DIAST BP 80-89 MM HG: CPT | Mod: CPTII,,, | Performed by: NURSE PRACTITIONER

## 2024-11-06 PROCEDURE — 1160F RVW MEDS BY RX/DR IN RCRD: CPT | Mod: CPTII,,, | Performed by: NURSE PRACTITIONER

## 2024-11-06 RX ORDER — FLUCONAZOLE 200 MG/1
200 TABLET ORAL DAILY
Qty: 1 TABLET | Refills: 0 | Status: SHIPPED | OUTPATIENT
Start: 2024-11-06 | End: 2024-11-07

## 2024-11-06 NOTE — PROGRESS NOTES
Patient ID: [id number]     Chief Complaint: Diabetes and Hypertension      HPI:     Ofelia Abad is a 58 y.o. female in the office for Diabetes and Hypertension  Feeling well, following up on blood pressure. Blood sugar between .    Past Medical History:  has a past medical history of Diabetes mellitus, type 2, Fibroid, Heart murmur, Hyperlipidemia, Hypertension, Seasonal allergies, and Unspecified macular degeneration.    Social History:  reports that she has never smoked. She has never been exposed to tobacco smoke. She has never used smokeless tobacco. She reports that she does not currently use alcohol after a past usage of about 1.0 standard drink of alcohol per week. She reports that she does not use drugs.    Current Outpatient Medications   Medication Instructions    blood sugar diagnostic Strp To check BG 1 times daily, to use with insurance preferred meter    blood-glucose meter kit To check BG 1 times daily, to use with insurance preferred meter    cetirizine (ZYRTEC) 10 mg, Oral, Daily    fluconazole (DIFLUCAN) 200 mg, Oral, Daily    fluticasone propionate (FLONASE) 50 mcg/actuation nasal spray 1 spray, As needed (PRN)    irbesartan (AVAPRO) 300 mg, Oral, Daily    JARDIANCE 25 mg, Oral    ketorolac 0.5% (ACULAR) 0.5 % Drop 1 drop, 2 times daily    labetaloL (NORMODYNE) 200 MG tablet     lancets Misc To check BG 1 times daily, to use with insurance preferred meter    NIFEdipine (ADALAT CC) 30 mg, Oral, Daily    OZEMPIC 0.5 mg, Subcutaneous, Every 7 days    paroxetine (PAXIL) 10 mg, Oral, Daily    rosuvastatin (CRESTOR) 10 mg, Oral, Nightly    temazepam (RESTORIL) 15 mg, Oral, Nightly PRN    triamcinolone acetonide 0.1% (KENALOG) 0.1 % cream Topical (Top), 2 times daily       Patient is allergic to norvasc [amlodipine].     Patient Care Team:  Dina Dexter FNP-C as PCP - General (Family Medicine)  [facility], [facility]  Pinky Xavier MD as Consulting Physician (Obstetrics and Gynecology)  "    Subjective     Review of Systems    See HPI     Objective     Visit Vitals  /80 (BP Location: Left arm)   Pulse 79   Temp 97.2 °F (36.2 °C) (Temporal)   Ht 5' 0.98" (1.549 m)   Wt 70.3 kg (155 lb)   SpO2 98%   BMI 29.30 kg/m²       Physical Exam  Vitals reviewed.   Constitutional:       General: She is not in acute distress.  Cardiovascular:      Rate and Rhythm: Normal rate and regular rhythm.   Pulmonary:      Effort: Pulmonary effort is normal.      Breath sounds: Normal breath sounds.   Skin:     General: Skin is warm and dry.   Neurological:      Mental Status: She is alert and oriented to person, place, and time.   Psychiatric:         Mood and Affect: Mood normal.         Assessment & Plan:     1. Primary hypertension  Overview:  On irbesartan/HCTZ, metoprolol 200 bid, amlodipine 5 mg      2. Candidal vaginitis  Comments:  educated on use of wipes and barrier cream    Other orders  -     fluconazole (DIFLUCAN) 200 MG Tab; Take 1 tablet (200 mg total) by mouth once daily. for 1 day  Dispense: 1 tablet; Refill: 0       Follow up in about 3 months (around 2/6/2025) for BP, DM,non fasting labs prior. In addition to their next scheduled appointment, the patient has also been instructed to follow up on as needed basis.     Future Appointments   Date Time Provider Department Center   11/13/2024 10:10 AM Pinky Xavier MD [facility]   2/4/2025  9:30 AM LAB, [facility] LABORATORY DRAW STATION [facility] MICKEY Mcmillan   2/12/2025  8:30 AM Dina Dexter FNP-C [facility] GIOVANNA Mcmillan      "

## 2024-11-15 ENCOUNTER — TELEPHONE (OUTPATIENT)
Dept: FAMILY MEDICINE | Facility: CLINIC | Age: 59
End: 2024-11-15
Payer: MEDICAID

## 2024-11-15 DIAGNOSIS — I10 PRIMARY HYPERTENSION: ICD-10-CM

## 2024-11-15 RX ORDER — LABETALOL 200 MG/1
200 TABLET, FILM COATED ORAL 2 TIMES DAILY
Qty: 90 TABLET | Refills: 1 | Status: SHIPPED | OUTPATIENT
Start: 2024-11-15

## 2025-01-13 DIAGNOSIS — E11.9 TYPE 2 DIABETES MELLITUS WITHOUT COMPLICATION, WITHOUT LONG-TERM CURRENT USE OF INSULIN: Primary | ICD-10-CM

## 2025-02-04 PROCEDURE — 85025 COMPLETE CBC W/AUTO DIFF WBC: CPT | Performed by: NURSE PRACTITIONER

## 2025-02-04 PROCEDURE — 80053 COMPREHEN METABOLIC PANEL: CPT | Performed by: NURSE PRACTITIONER

## 2025-02-04 PROCEDURE — 83036 HEMOGLOBIN GLYCOSYLATED A1C: CPT | Performed by: NURSE PRACTITIONER

## 2025-02-07 DIAGNOSIS — I10 PRIMARY HYPERTENSION: ICD-10-CM

## 2025-02-07 RX ORDER — LABETALOL 200 MG/1
200 TABLET, FILM COATED ORAL 2 TIMES DAILY
Qty: 90 TABLET | Refills: 0 | Status: SHIPPED | OUTPATIENT
Start: 2025-02-07

## 2025-02-12 ENCOUNTER — OFFICE VISIT (OUTPATIENT)
Dept: FAMILY MEDICINE | Facility: CLINIC | Age: 60
End: 2025-02-12
Payer: MEDICAID

## 2025-02-12 VITALS
TEMPERATURE: 97 F | BODY MASS INDEX: 27.56 KG/M2 | OXYGEN SATURATION: 97 % | HEART RATE: 85 BPM | WEIGHT: 146 LBS | SYSTOLIC BLOOD PRESSURE: 138 MMHG | HEIGHT: 61 IN | DIASTOLIC BLOOD PRESSURE: 84 MMHG

## 2025-02-12 DIAGNOSIS — L60.0 INGROWING TOENAIL: ICD-10-CM

## 2025-02-12 DIAGNOSIS — E11.9 TYPE 2 DIABETES MELLITUS WITHOUT COMPLICATION, WITHOUT LONG-TERM CURRENT USE OF INSULIN: ICD-10-CM

## 2025-02-12 DIAGNOSIS — L30.9 ECZEMA, UNSPECIFIED TYPE: ICD-10-CM

## 2025-02-12 DIAGNOSIS — E87.6 HYPOKALEMIA: ICD-10-CM

## 2025-02-12 DIAGNOSIS — I10 PRIMARY HYPERTENSION: Primary | ICD-10-CM

## 2025-02-12 PROCEDURE — 99214 OFFICE O/P EST MOD 30 MIN: CPT | Mod: ,,, | Performed by: NURSE PRACTITIONER

## 2025-02-12 PROCEDURE — G2211 COMPLEX E/M VISIT ADD ON: HCPCS | Mod: ,,, | Performed by: NURSE PRACTITIONER

## 2025-02-12 PROCEDURE — 4010F ACE/ARB THERAPY RXD/TAKEN: CPT | Mod: CPTII,,, | Performed by: NURSE PRACTITIONER

## 2025-02-12 PROCEDURE — 1160F RVW MEDS BY RX/DR IN RCRD: CPT | Mod: CPTII,,, | Performed by: NURSE PRACTITIONER

## 2025-02-12 PROCEDURE — 3008F BODY MASS INDEX DOCD: CPT | Mod: CPTII,,, | Performed by: NURSE PRACTITIONER

## 2025-02-12 PROCEDURE — 3079F DIAST BP 80-89 MM HG: CPT | Mod: CPTII,,, | Performed by: NURSE PRACTITIONER

## 2025-02-12 PROCEDURE — 1159F MED LIST DOCD IN RCRD: CPT | Mod: CPTII,,, | Performed by: NURSE PRACTITIONER

## 2025-02-12 PROCEDURE — 3044F HG A1C LEVEL LT 7.0%: CPT | Mod: CPTII,,, | Performed by: NURSE PRACTITIONER

## 2025-02-12 PROCEDURE — 3075F SYST BP GE 130 - 139MM HG: CPT | Mod: CPTII,,, | Performed by: NURSE PRACTITIONER

## 2025-02-12 RX ORDER — POTASSIUM CHLORIDE 20 MEQ/1
20 TABLET, EXTENDED RELEASE ORAL DAILY
Qty: 30 TABLET | Refills: 0 | Status: SHIPPED | OUTPATIENT
Start: 2025-02-12

## 2025-02-12 RX ORDER — BETAMETHASONE VALERATE 1 MG/G
CREAM TOPICAL 2 TIMES DAILY
Qty: 15 G | Refills: 1 | Status: SHIPPED | OUTPATIENT
Start: 2025-02-12

## 2025-02-12 NOTE — ASSESSMENT & PLAN NOTE
Triamcinolone not effective in controlling exacerbations.  We will increase the potency steroid cream.  RTC SWOP

## 2025-02-12 NOTE — ASSESSMENT & PLAN NOTE
Lab Results   Component Value Date    HGBA1C 6.2 (H) 02/04/2025   Continue medication.   Follow ADA Diet. Avoid soda, simple sweets, and limit rice/pasta/breads/starches (no more than 45-50 grams per meal).  Maintain healthy weight with goal BMI <30.  Exercise 5 times per week for 30 minutes per day.  Stressed importance of daily foot exams.  Stressed importance of annual dilated eye exam.

## 2025-02-12 NOTE — PROGRESS NOTES
Patient ID: [id number]     Chief Complaint: lab follow up      HPI:     Ofelia Abad is a 59 y.o. female here today for lab follow up      HPI    Past Medical History:  has a past medical history of Diabetes mellitus, type 2, Fibroid, Heart murmur, Hyperlipidemia, Hypertension, Seasonal allergies, and Unspecified macular degeneration.    Surgical History:  has a past surgical history that includes Breast biopsy (Right, 1981); Tumor removal (Right, 1978); Tubal ligation; Tubal Reversal ; and Anterior cruciate ligament repair (Right, 1989).    Family History: family history includes Breast cancer in her maternal aunt and maternal grandmother; Breast cancer (age of onset: 44) in her sister; Cancer in her mother; Diabetes in her father and mother; Heart failure in her maternal aunt; Hypertension in her father; Ovarian cancer in her mother; Rashes / Skin problems in her sister; Stroke in her maternal aunt; Thyroid disease in her mother.    Social History:  reports that she has never smoked. She has never been exposed to tobacco smoke. She has never used smokeless tobacco. She reports that she does not currently use alcohol after a past usage of about 1.0 standard drink of alcohol per week. She reports that she does not use drugs.    Current Outpatient Medications   Medication Instructions    betamethasone valerate 0.1% (VALISONE) 0.1 % Crea Topical (Top), 2 times daily    blood sugar diagnostic Strp To check BG 1 times daily, to use with insurance preferred meter    blood-glucose meter kit To check BG 1 times daily, to use with insurance preferred meter    cetirizine (ZYRTEC) 10 mg, Oral, Daily    fluticasone propionate (FLONASE) 50 mcg/actuation nasal spray 1 spray, As needed (PRN)    irbesartan (AVAPRO) 300 mg, Oral, Daily    JARDIANCE 25 mg, Oral    ketorolac 0.5% (ACULAR) 0.5 % Drop 1 drop, 2 times daily    labetaloL (NORMODYNE) 200 mg, Oral, 2 times daily    lancets Misc To check BG 1 times daily, to use with  "insurance preferred meter    NIFEdipine (ADALAT CC) 30 mg, Oral, Daily    OZEMPIC 0.5 mg, Subcutaneous, Every 7 days    potassium chloride SA (K-DUR,KLOR-CON) 20 MEQ tablet 20 mEq, Oral, Daily    rosuvastatin (CRESTOR) 10 mg, Oral, Nightly    temazepam (RESTORIL) 15 mg, Oral, Nightly PRN       Patient is allergic to norvasc [amlodipine].     Patient Care Team:  Dina Dexter FNP-C as PCP - General (Family Medicine)  [facility], [facility]  Pinky Xavier MD as Consulting Physician (Obstetrics and Gynecology)       Subjective:     Review of Systems    See HPI     Objective:     Visit Vitals  /84 (BP Location: Right arm)   Pulse 85   Temp 96.8 °F (36 °C) (Temporal)   Ht 5' 0.98" (1.549 m)   Wt 66.2 kg (146 lb)   SpO2 97%   BMI 27.60 kg/m²       Physical Exam    Labs Reviewed:     Chemistry:  Lab Results   Component Value Date     02/04/2025    K 3.4 (L) 02/04/2025    BUN 25 (H) 02/04/2025    CREATININE 1.10 02/04/2025    EGFRNORACEVR 58 02/04/2025    GLUCOSE 100 02/04/2025    CALCIUM 9.2 02/04/2025    ALKPHOS 92 02/04/2025    LABPROT 7.0 02/04/2025    ALBUMIN 4.4 02/04/2025    AST 29 02/04/2025    ALT 18 02/04/2025    FZFWLTZA14AS 22 (L) 08/20/2024    TSH 1.610 08/20/2024        Lab Results   Component Value Date    HGBA1C 6.2 (H) 02/04/2025        Hematology:  Lab Results   Component Value Date    WBC 7.33 02/04/2025    RBC 4.43 02/04/2025    HGB 13.4 02/04/2025    HCT 37.4 02/04/2025    MCV 84.4 02/04/2025    MCH 30.2 02/04/2025    MCHC 35.8 02/04/2025    RDW 12.2 02/04/2025     02/04/2025    MPV 9.9 02/04/2025       Lipid Panel:  Lab Results   Component Value Date    CHOL 161 10/09/2024    HDL 46 10/09/2024    LDLDIRECT 88.0 10/09/2024    TRIG 89 10/09/2024         Assessment & Plan:     1. Primary hypertension  Overview:  On irbesartan/HCTZ, metoprolol 200 bid, nifedipine 30 mg    Assessment & Plan:  Well controlled.   Continue current meds  Low Sodium Diet (DASH Diet - Less than 2 grams " of sodium per day).  Monitor blood pressure daily and log. Report consistent numbers greater than 140/90.  Maintain healthy weight with goal BMI <30. Exercise 30 minutes per day, 5 days per week.  Smoking cessation encouraged to aid in BP reduction.      Orders:  -     CBC Auto Differential; Future; Expected date: 06/12/2025  -     Comprehensive Metabolic Panel; Future; Expected date: 06/12/2025  -     Lipid Panel; Future; Expected date: 06/12/2025  -     Hemoglobin A1C; Future; Expected date: 06/12/2025  -     TSH; Future; Expected date: 06/12/2025    2. Type 2 diabetes mellitus without complication, without long-term current use of insulin  Overview:  On jardiance 25 mg, ozempic 0.5 mg    Assessment & Plan:  Lab Results   Component Value Date    HGBA1C 6.2 (H) 02/04/2025   Continue medication.   Follow ADA Diet. Avoid soda, simple sweets, and limit rice/pasta/breads/starches (no more than 45-50 grams per meal).  Maintain healthy weight with goal BMI <30.  Exercise 5 times per week for 30 minutes per day.  Stressed importance of daily foot exams.  Stressed importance of annual dilated eye exam.        3. Ingrowing toenail  -     Ambulatory referral/consult to Podiatry; Future; Expected date: 02/19/2025    4. Eczema, unspecified type  Assessment & Plan:  Triamcinolone not effective in controlling exacerbations.  We will increase the potency steroid cream.  RTC SWOP    Orders:  -     betamethasone valerate 0.1% (VALISONE) 0.1 % Crea; Apply topically 2 (two) times daily.  Dispense: 15 g; Refill: 1    5. Hypokalemia  Comments:  start KDur 20 meq.  Repeat BMP in 1-2 weeks.  Orders:  -     potassium chloride SA (K-DUR,KLOR-CON) 20 MEQ tablet; Take 1 tablet (20 mEq total) by mouth once daily.  Dispense: 30 tablet; Refill: 0  -     Basic Metabolic Panel; Future; Expected date: 02/12/2025       Follow up in about 4 months (around 6/12/2025) for Wellness, fasting labs prior, 2 weeks not fast labs . In addition to their  scheduled follow up, the patient has also been instructed to follow up on as needed basis.     Future Appointments   Date Time Provider Department Center   6/18/2025  8:40 AM LAB, [facility] LABORATORY DRAW STATION JONA MICKEY Mcmillan   6/24/2025  8:00 AM Dina Dexter, [facility] GIOVANNA Mcmillan

## 2025-02-19 ENCOUNTER — LAB VISIT (OUTPATIENT)
Dept: LAB | Facility: HOSPITAL | Age: 60
End: 2025-02-19
Payer: MEDICAID

## 2025-02-19 DIAGNOSIS — M79.674 PAIN IN TOE OF RIGHT FOOT: ICD-10-CM

## 2025-02-19 DIAGNOSIS — B35.1 DERMATOPHYTOSIS OF NAIL: ICD-10-CM

## 2025-02-19 DIAGNOSIS — M79.675 PAIN IN TOE OF LEFT FOOT: ICD-10-CM

## 2025-02-19 DIAGNOSIS — L60.0 INGROWING NAIL: Primary | ICD-10-CM

## 2025-02-19 LAB
ALBUMIN SERPL-MCNC: 4.6 G/DL (ref 3.4–5)
ALBUMIN/GLOB SERPL: 1.6 RATIO
ALP SERPL-CCNC: 82 UNIT/L (ref 50–144)
ALT SERPL-CCNC: 20 UNIT/L (ref 1–45)
AST SERPL-CCNC: 27 UNIT/L (ref 14–36)
BILIRUB SERPL-MCNC: 0.6 MG/DL (ref 0–1)
BILIRUBIN DIRECT+TOT PNL SERPL-MCNC: 0.3 MG/DL (ref 0–0.3)
GLOBULIN SER-MCNC: 2.8 GM/DL (ref 2–3.9)
PROT SERPL-MCNC: 7.4 GM/DL (ref 6.3–8.2)

## 2025-02-19 PROCEDURE — 80076 HEPATIC FUNCTION PANEL: CPT

## 2025-02-19 PROCEDURE — 36415 COLL VENOUS BLD VENIPUNCTURE: CPT

## 2025-03-21 DIAGNOSIS — I10 PRIMARY HYPERTENSION: ICD-10-CM

## 2025-03-21 RX ORDER — LABETALOL 200 MG/1
200 TABLET, FILM COATED ORAL 2 TIMES DAILY
Qty: 90 TABLET | Refills: 0 | Status: SHIPPED | OUTPATIENT
Start: 2025-03-21

## 2025-04-15 ENCOUNTER — RESULTS FOLLOW-UP (OUTPATIENT)
Dept: FAMILY MEDICINE | Facility: CLINIC | Age: 60
End: 2025-04-15

## 2025-04-15 ENCOUNTER — TELEPHONE (OUTPATIENT)
Dept: FAMILY MEDICINE | Facility: CLINIC | Age: 60
End: 2025-04-15

## 2025-04-15 ENCOUNTER — OFFICE VISIT (OUTPATIENT)
Dept: FAMILY MEDICINE | Facility: CLINIC | Age: 60
End: 2025-04-15
Payer: MEDICAID

## 2025-04-15 VITALS
WEIGHT: 146.19 LBS | HEIGHT: 61 IN | OXYGEN SATURATION: 98 % | SYSTOLIC BLOOD PRESSURE: 174 MMHG | TEMPERATURE: 97 F | DIASTOLIC BLOOD PRESSURE: 86 MMHG | BODY MASS INDEX: 27.6 KG/M2 | HEART RATE: 77 BPM

## 2025-04-15 DIAGNOSIS — I10 PRIMARY HYPERTENSION: Primary | ICD-10-CM

## 2025-04-15 DIAGNOSIS — E87.6 HYPOKALEMIA: ICD-10-CM

## 2025-04-15 DIAGNOSIS — R60.0 LOCALIZED EDEMA: ICD-10-CM

## 2025-04-15 LAB
ANION GAP SERPL CALC-SCNC: 7 MEQ/L (ref 2–13)
BUN SERPL-MCNC: 16 MG/DL (ref 7–20)
CALCIUM SERPL-MCNC: 9.7 MG/DL (ref 8.4–10.2)
CHLORIDE SERPL-SCNC: 109 MMOL/L (ref 98–110)
CO2 SERPL-SCNC: 24 MMOL/L (ref 21–32)
CREAT SERPL-MCNC: 1.11 MG/DL (ref 0.66–1.25)
CREAT/UREA NIT SERPL: 14 (ref 12–20)
GFR SERPLBLD CREATININE-BSD FMLA CKD-EPI: 57 ML/MIN/1.73/M2
GLUCOSE SERPL-MCNC: 127 MG/DL (ref 70–115)
POTASSIUM SERPL-SCNC: 4 MMOL/L (ref 3.5–5.1)
SODIUM SERPL-SCNC: 140 MMOL/L (ref 136–145)

## 2025-04-15 PROCEDURE — 3008F BODY MASS INDEX DOCD: CPT | Mod: CPTII,,, | Performed by: NURSE PRACTITIONER

## 2025-04-15 PROCEDURE — 3077F SYST BP >= 140 MM HG: CPT | Mod: CPTII,,, | Performed by: NURSE PRACTITIONER

## 2025-04-15 PROCEDURE — 4010F ACE/ARB THERAPY RXD/TAKEN: CPT | Mod: CPTII,,, | Performed by: NURSE PRACTITIONER

## 2025-04-15 PROCEDURE — 1160F RVW MEDS BY RX/DR IN RCRD: CPT | Mod: CPTII,,, | Performed by: NURSE PRACTITIONER

## 2025-04-15 PROCEDURE — 99214 OFFICE O/P EST MOD 30 MIN: CPT | Mod: ,,, | Performed by: NURSE PRACTITIONER

## 2025-04-15 PROCEDURE — 1159F MED LIST DOCD IN RCRD: CPT | Mod: CPTII,,, | Performed by: NURSE PRACTITIONER

## 2025-04-15 PROCEDURE — 3079F DIAST BP 80-89 MM HG: CPT | Mod: CPTII,,, | Performed by: NURSE PRACTITIONER

## 2025-04-15 PROCEDURE — 3044F HG A1C LEVEL LT 7.0%: CPT | Mod: CPTII,,, | Performed by: NURSE PRACTITIONER

## 2025-04-15 PROCEDURE — 80048 BASIC METABOLIC PNL TOTAL CA: CPT | Performed by: NURSE PRACTITIONER

## 2025-04-15 RX ORDER — SPIRONOLACTONE 25 MG/1
25 TABLET ORAL DAILY
Qty: 30 TABLET | Refills: 0 | Status: SHIPPED | OUTPATIENT
Start: 2025-04-15

## 2025-04-15 NOTE — PROGRESS NOTES
Patient ID: Ofelia Abad  : 1965    Chief Complaint: Foot Swelling and Headache    Allergies: Patient is allergic to norvasc [amlodipine].     Subjective :  The patient is a 59 y.o. Black or  female who presents to clinic for follow up on Foot Swelling and Headache   History of Present Illness    HPI:  Patient reports elevated blood pressure, confirmed by a recent measurement of 170/98. She has noticed swelling in her feet and ankles for about the last week, which is unusual for her. She reports feeling unwell yesterday, staying in bed all day, and having a headache. She denies any changes in her diet, stating that she monitors her salt intake carefully. Patient takes 3 blood pressure medications: nifedipine 30mg, labetalol 200mg twice daily, and irbesartan. Her medication was changed from including hydrochlorothiazide (HCTZ) to plain irbesartan in August of last year by her primary provider. She was prescribed potassium supplements in February due to low potassium levels. She works long hours, from 5 AM to 4 PM, which may be contributing to her symptoms. The swelling is worse when she goes to work and improves when she stays off her feet, as she did yesterday.    MEDICATIONS:  Patient is on Nifedipine 30 mg, Labetalol 200 mg twice daily, and Irbesartan for blood pressure management. She started a potassium supplement in February due to low potassium levels. The HCTZ (hydrochlorothiazide) component of Irbesartan HCTZ was discontinued in August of last year due to frequent urination. At some point in the past, her medication was changed from metoprolol to labetalol.    MEDICAL HISTORY:  Patient has a history of hypertension.    SURGICAL HISTORY:  Patient underwent an ingrown toenail removal procedure in February.    TEST RESULTS:  In February, the patient's potassium levels were found to be low, prompting the initiation of a potassium supplement.      ROS:  ROS as indicated in HPI.      "      Social History:  reports that she has never smoked. She has never been exposed to tobacco smoke. She has never used smokeless tobacco. She reports that she does not currently use alcohol after a past usage of about 1.0 standard drink of alcohol per week. She reports that she does not use drugs.    Past Medical History:  has a past medical history of Diabetes mellitus, type 2, Fibroid, Heart murmur, Hyperlipidemia, Hypertension, Seasonal allergies, and Unspecified macular degeneration.    Current Medications:  Current Outpatient Medications   Medication Instructions    betamethasone valerate 0.1% (VALISONE) 0.1 % Crea Topical (Top), 2 times daily    blood sugar diagnostic Strp To check BG 1 times daily, to use with insurance preferred meter    blood-glucose meter kit To check BG 1 times daily, to use with insurance preferred meter    cetirizine (ZYRTEC) 10 mg, Oral, Daily    fluticasone propionate (FLONASE) 50 mcg/actuation nasal spray 1 spray, As needed (PRN)    irbesartan (AVAPRO) 300 mg, Oral, Daily    JARDIANCE 25 mg, Oral    ketorolac 0.5% (ACULAR) 0.5 % Drop 1 drop, 2 times daily    labetaloL (NORMODYNE) 200 mg, Oral, 2 times daily    lancets Misc To check BG 1 times daily, to use with insurance preferred meter    NIFEdipine (ADALAT CC) 30 mg, Oral, Daily    OZEMPIC 0.5 mg, Subcutaneous, Every 7 days    potassium chloride SA (K-DUR,KLOR-CON) 20 MEQ tablet 20 mEq, Oral, Daily    rosuvastatin (CRESTOR) 10 mg, Oral, Nightly    spironolactone (ALDACTONE) 25 mg, Oral, Daily    temazepam (RESTORIL) 15 mg, Oral, Nightly PRN         Visit Vitals  BP (!) 174/86 (BP Location: Left arm, Patient Position: Sitting)   Pulse 77   Temp 97.2 °F (36.2 °C) (Temporal)   Ht 5' 0.98" (1.549 m)   Wt 66.3 kg (146 lb 3.2 oz)   SpO2 98%   BMI 27.64 kg/m²       Physical Exam  Vitals reviewed.   Constitutional:       Appearance: Normal appearance.   Cardiovascular:      Rate and Rhythm: Normal rate and regular rhythm.      Pulses: " Normal pulses.      Heart sounds: Normal heart sounds.   Pulmonary:      Effort: Pulmonary effort is normal.      Breath sounds: Normal breath sounds.   Musculoskeletal:      Right lower leg: No edema.      Left lower leg: No edema.   Skin:     General: Skin is warm and dry.          Labs Reviewed:  Chemistry:  Lab Results   Component Value Date     02/04/2025    K 3.4 (L) 02/04/2025    BUN 25 (H) 02/04/2025    CREATININE 1.10 02/04/2025    EGFRNORACEVR 58 02/04/2025    GLUCOSE 100 02/04/2025    CALCIUM 9.2 02/04/2025    ALKPHOS 82 02/19/2025    LABPROT 7.4 02/19/2025    ALBUMIN 4.6 02/19/2025    BILIDIR 0.3 02/19/2025    AST 27 02/19/2025    ALT 20 02/19/2025    JURGRWTK73BL 22 (L) 08/20/2024    TSH 1.610 08/20/2024        Lab Results   Component Value Date    HGBA1C 6.2 (H) 02/04/2025        Hematology:  Lab Results   Component Value Date    WBC 7.33 02/04/2025    RBC 4.43 02/04/2025    HGB 13.4 02/04/2025    HCT 37.4 02/04/2025    MCV 84.4 02/04/2025    MCH 30.2 02/04/2025    MCHC 35.8 02/04/2025    RDW 12.2 02/04/2025     02/04/2025    MPV 9.9 02/04/2025         Assessment & Plan:  1. Primary hypertension  Overview:  On irbesartan/HCTZ, Labetalol 200 bid, nifedipine 30 mg  08/2024: switched to plain Irbesartan    Orders:  -     spironolactone (ALDACTONE) 25 MG tablet; Take 1 tablet (25 mg total) by mouth once daily.  Dispense: 30 tablet; Refill: 0    2. Localized edema    3. Hypokalemia  -     Basic Metabolic Panel; Future; Expected date: 04/15/2025         Assessment & Plan    I10 Primary hypertension  R60.0 Localized edema  E87.6 Hypokalemia    IMPRESSION:   Elevated BP (170/98) and recent onset of feet/ankle swelling.   Reviewed current antihypertensive regimen, noting recent change from Irbesartan HCTZ to plain Irbesartan.   Considered impact of medication changes on potassium levels, given current potassium supplementation.   Started spironolactone (Aldactone) as a potassium-sparing diuretic to  address HTN and edema while maintaining potassium levels.    I10 PRIMARY HYPERTENSION:   Started spironolactone (Aldactone) as a potassium-sparing diuretic to address HTN.   Contact the office if unable to tolerate the new medication due to increased urination.   Follow up in 1 week to recheck BP and assess response to new medication.    R60.0 LOCALIZED EDEMA:   Discussed the potential benefits of compression stockings for managing lower extremity edema, particularly for those who stand for long periods.   Recommend using compression stockings while at work to help manage lower extremity edema.   Started spironolactone (Aldactone) as a potassium-sparing diuretic to address edema.    E87.6 HYPOKALEMIA:   Explained the relationship between potassium levels and heart rhythm.   Started spironolactone (Aldactone) as a potassium-sparing diuretic to maintain potassium levels.   Ordered potassium level check to guide potential adjustments to potassium supplementation.         Future Appointments   Date Time Provider Department Center   4/22/2025 10:45 AM Dina Dexter FNP-C [facility] GI[facility]   6/18/2025  8:40 AM LAB, [facility] LABORATORY DRAW STATION [facility] MICKEY [facility]   6/24/2025  8:00 AM Dina Dexter FNP-DALIA [facility]       Follow up for 1 wk f/u, HTN with Dina Dextre. Call sooner if needed.    CE Oh    Lab Frequency Next Occurrence   Ambulatory referral/consult to Podiatry Once 02/19/2025   Basic Metabolic Panel Once 02/12/2025   CBC Auto Differential Once 06/12/2025   Comprehensive Metabolic Panel Once 06/12/2025   Lipid Panel Once 06/12/2025   Hemoglobin A1C Once 06/12/2025   TSH Once 06/12/2025            This note was generated with the assistance of ambient listening technology. Verbal consent was obtained by the patient and accompanying visitor(s) for the recording of patient appointment to facilitate this note. I attest to having reviewed and edited the generated note  for accuracy, though some syntax or spelling errors may persist. Please contact the author of this note for any clarification.

## 2025-04-15 NOTE — TELEPHONE ENCOUNTER
----- Message from CE Medina sent at 4/15/2025  5:00 PM CDT -----  Tell her kidneys look good and her potassium level is good right now.  She needs to STOP taking the potassium chloride since we started the spironolactone today and that is going to hold potassium   back in her body.  ----- Message -----  From: Lab, Background User  Sent: 4/15/2025  12:09 PM CDT  To: CE Yung

## 2025-04-15 NOTE — LETTER
April 15, 2025      [facility]  [address], [place] [address]  Phone: [phone]  Fax: [phone]       Patient: Ofelia Abad   YOB: 1965  Date of Visit: 04/15/2025    To Whom It May Concern:    Gerald Abad  was at [facility] on 04/15/2025; she was ill and absent from work on Monday 04/14 and Tuesday 04/15. The patient may return to work on 04/16/2025 with no restrictions. If you have any questions or concerns, or if I can be of further assistance, please do not hesitate to contact me.      Sincerely,        CROW Yung-DALIA

## 2025-04-22 ENCOUNTER — OFFICE VISIT (OUTPATIENT)
Dept: FAMILY MEDICINE | Facility: CLINIC | Age: 60
End: 2025-04-22
Payer: MEDICAID

## 2025-04-22 VITALS
BODY MASS INDEX: 28.44 KG/M2 | HEART RATE: 74 BPM | WEIGHT: 150.63 LBS | OXYGEN SATURATION: 98 % | DIASTOLIC BLOOD PRESSURE: 88 MMHG | HEIGHT: 61 IN | TEMPERATURE: 98 F | SYSTOLIC BLOOD PRESSURE: 154 MMHG

## 2025-04-22 DIAGNOSIS — I10 PRIMARY HYPERTENSION: ICD-10-CM

## 2025-04-22 DIAGNOSIS — J30.9 ALLERGIC RHINITIS, UNSPECIFIED SEASONALITY, UNSPECIFIED TRIGGER: ICD-10-CM

## 2025-04-22 PROCEDURE — 1160F RVW MEDS BY RX/DR IN RCRD: CPT | Mod: CPTII,,, | Performed by: NURSE PRACTITIONER

## 2025-04-22 PROCEDURE — G2211 COMPLEX E/M VISIT ADD ON: HCPCS | Mod: ,,, | Performed by: NURSE PRACTITIONER

## 2025-04-22 PROCEDURE — 3008F BODY MASS INDEX DOCD: CPT | Mod: CPTII,,, | Performed by: NURSE PRACTITIONER

## 2025-04-22 PROCEDURE — 3044F HG A1C LEVEL LT 7.0%: CPT | Mod: CPTII,,, | Performed by: NURSE PRACTITIONER

## 2025-04-22 PROCEDURE — 3079F DIAST BP 80-89 MM HG: CPT | Mod: CPTII,,, | Performed by: NURSE PRACTITIONER

## 2025-04-22 PROCEDURE — 99213 OFFICE O/P EST LOW 20 MIN: CPT | Mod: ,,, | Performed by: NURSE PRACTITIONER

## 2025-04-22 PROCEDURE — 1159F MED LIST DOCD IN RCRD: CPT | Mod: CPTII,,, | Performed by: NURSE PRACTITIONER

## 2025-04-22 PROCEDURE — 4010F ACE/ARB THERAPY RXD/TAKEN: CPT | Mod: CPTII,,, | Performed by: NURSE PRACTITIONER

## 2025-04-22 PROCEDURE — 3077F SYST BP >= 140 MM HG: CPT | Mod: CPTII,,, | Performed by: NURSE PRACTITIONER

## 2025-04-22 RX ORDER — FLUTICASONE PROPIONATE 50 MCG
1 SPRAY, SUSPENSION (ML) NASAL
Qty: 16 G | Refills: 11 | Status: SHIPPED | OUTPATIENT
Start: 2025-04-22

## 2025-04-22 RX ORDER — SPIRONOLACTONE 25 MG/1
25 TABLET ORAL DAILY
Qty: 30 TABLET | Refills: 1 | Status: SHIPPED | OUTPATIENT
Start: 2025-04-22

## 2025-04-22 RX ORDER — NIFEDIPINE 60 MG/1
60 TABLET, EXTENDED RELEASE ORAL DAILY
Qty: 30 TABLET | Refills: 0 | Status: SHIPPED | OUTPATIENT
Start: 2025-04-22

## 2025-04-22 NOTE — ASSESSMENT & PLAN NOTE
Swelling improved with addition of spironolactone  Blood pressure not at goal, increase nifedipine to 60 mg and follow up in 2 weeks.  We will need to check BMP at her visit

## 2025-04-22 NOTE — PROGRESS NOTES
Patient ID: [id number]     Chief Complaint: Hypertension      Subjective     Ofelia Abad is a 59 y.o. female in the office for Hypertension      History of Present Illness    CHIEF COMPLAINT:  Patient presents today for follow up of swelling.    EDEMA AND BLOOD PRESSURE MANAGEMENT:  She reports decreased foot edema with spironolactone and denies high salt intake. She takes irbesartan, labetalol, and nifedipine in the morning. She expresses dissatisfaction with current potassium supplementation due to pill size.    SLEEP:  She reports poor sleep quality, waking approximately 3 times nightly for urination. She denies episodes of gasping for air or breathing cessation during sleep. Family history is significant for sleep apnea in an aunt who required CPAP therapy.      ROS:  ROS as indicated in HPI.         Past Medical History:  has a past medical history of Diabetes mellitus, type 2, Fibroid, Heart murmur, Hyperlipidemia, Hypertension, Seasonal allergies, and Unspecified macular degeneration.    Social History:  reports that she has never smoked. She has never been exposed to tobacco smoke. She has never used smokeless tobacco. She reports that she does not currently use alcohol after a past usage of about 1.0 standard drink of alcohol per week. She reports that she does not use drugs.    Current Outpatient Medications   Medication Instructions    betamethasone valerate 0.1% (VALISONE) 0.1 % Crea Topical (Top), 2 times daily    blood sugar diagnostic Strp To check BG 1 times daily, to use with insurance preferred meter    blood-glucose meter kit To check BG 1 times daily, to use with insurance preferred meter    cetirizine (ZYRTEC) 10 mg, Oral, Daily    fluticasone propionate (FLONASE) 50 mcg, Each Nostril, As needed (PRN)    irbesartan (AVAPRO) 300 mg, Oral, Daily    JARDIANCE 25 mg, Oral    ketorolac 0.5% (ACULAR) 0.5 % Drop 1 drop, 2 times daily    labetaloL (NORMODYNE) 200 mg, Oral, 2 times daily    lancets  "Misc To check BG 1 times daily, to use with insurance preferred meter    NIFEdipine (PROCARDIA-XL) 60 mg, Oral, Daily    OZEMPIC 0.5 mg, Subcutaneous, Every 7 days    potassium chloride SA (K-DUR,KLOR-CON) 20 MEQ tablet 20 mEq, Oral, Daily    rosuvastatin (CRESTOR) 10 mg, Oral, Nightly    spironolactone (ALDACTONE) 25 mg, Oral, Daily    temazepam (RESTORIL) 15 mg, Oral, Nightly PRN       Patient is allergic to norvasc [amlodipine].     Patient Care Team:  Dina Dexter FNP-C as PCP - General (Family Medicine)  Care, [facility]  Pinky Xavier MD as Consulting Physician (Obstetrics and Gynecology)     Objective     Visit Vitals  BP (!) 154/88 (BP Location: Right arm, Patient Position: Sitting)   Pulse 74   Temp 98.4 °F (36.9 °C) (Temporal)   Ht 5' 0.98" (1.549 m)   Wt 68.3 kg (150 lb 9.6 oz)   SpO2 98%   BMI 28.47 kg/m²       Physical Exam  Vitals reviewed.   Constitutional:       General: She is not in acute distress.     Appearance: Normal appearance.   Cardiovascular:      Rate and Rhythm: Normal rate.   Pulmonary:      Effort: Pulmonary effort is normal.   Musculoskeletal:      Right lower leg: No edema.      Left lower leg: No edema.   Skin:     General: Skin is warm and dry.   Psychiatric:         Mood and Affect: Mood normal.         Assessment & Plan     1. Allergic rhinitis, unspecified seasonality, unspecified trigger  -     fluticasone propionate (FLONASE) 50 mcg/actuation nasal spray; 1 spray (50 mcg total) by Each Nostril route as needed for Rhinitis.  Dispense: 16 g; Refill: 11    2. Primary hypertension  Overview:  On irbesartan, Labetalol 200 bid, nifedipine 30 mg  08/2024: d/c'd HCTZ (did not tolerate)    Assessment & Plan:  Swelling improved with addition of spironolactone  Blood pressure not at goal, increase nifedipine to 60 mg and follow up in 2 weeks.  We will need to check BMP at her visit    Orders:  -     NIFEdipine (PROCARDIA-XL) 60 MG (OSM) 24 hr tablet; Take 1 tablet (60 mg " total) by mouth once daily.  Dispense: 30 tablet; Refill: 0  -     Basic Metabolic Panel; Future; Expected date: 04/22/2025         Assessment & Plan    J30.9 Allergic rhinitis, unspecified seasonality, unspecified trigger  I10 Primary hypertension    IMPRESSION:  - Assessed BP control and edema management.  - Evaluated for potential sleep apnea based on symptoms and family history.    I10 PRIMARY HYPERTENSION:  - Increased nifedipine dose.  - Take new, higher-dose prescription in the morning with other BP medications; discard old prescription.  - Ordered potassium level check for next visit.  - Follow up in 2 weeks for BP check and potassium level assessment.          Follow up in about 2 weeks (around 5/6/2025) for 15M, BP. In addition to their next scheduled appointment, the patient has also been instructed to follow up on as needed basis.     Future Appointments   Date Time Provider Department Center   5/6/2025  1:45 PM Dina Dexter FNP-C [facility]   6/18/2025  8:40 AM LAB, [facility] LABORATORY DRAW STATION [facility] MICKEY [facility]   6/24/2025  8:00 [facility]        Lab Frequency Next Occurrence   Ambulatory referral/consult to Podiatry Once 02/19/2025   Basic Metabolic Panel Once 02/12/2025   CBC Auto Differential Once 06/12/2025   Comprehensive Metabolic Panel Once 06/12/2025   Lipid Panel Once 06/12/2025   Hemoglobin A1C Once 06/12/2025   TSH Once 06/12/2025       This note was generated with the assistance of ambient listening technology. Verbal consent was obtained by the patient and accompanying visitor(s) for the recording of patient appointment to facilitate this note. I attest to having reviewed and edited the generated note for accuracy, though some syntax or spelling errors may persist. Please contact the author of this note for any clarification.    Baseball game later today

## 2025-05-01 DIAGNOSIS — I10 PRIMARY HYPERTENSION: ICD-10-CM

## 2025-05-01 RX ORDER — LABETALOL 200 MG/1
200 TABLET, FILM COATED ORAL 2 TIMES DAILY
Qty: 60 TABLET | Refills: 5 | Status: SHIPPED | OUTPATIENT
Start: 2025-05-01

## 2025-05-06 ENCOUNTER — OFFICE VISIT (OUTPATIENT)
Dept: FAMILY MEDICINE | Facility: CLINIC | Age: 60
End: 2025-05-06
Payer: MEDICAID

## 2025-05-06 ENCOUNTER — RESULTS FOLLOW-UP (OUTPATIENT)
Dept: FAMILY MEDICINE | Facility: CLINIC | Age: 60
End: 2025-05-06

## 2025-05-06 VITALS
SYSTOLIC BLOOD PRESSURE: 138 MMHG | HEIGHT: 61 IN | HEART RATE: 76 BPM | WEIGHT: 151.19 LBS | BODY MASS INDEX: 28.55 KG/M2 | OXYGEN SATURATION: 100 % | DIASTOLIC BLOOD PRESSURE: 80 MMHG | TEMPERATURE: 98 F

## 2025-05-06 DIAGNOSIS — R25.2 CRAMPING OF FEET: ICD-10-CM

## 2025-05-06 DIAGNOSIS — I10 PRIMARY HYPERTENSION: Primary | ICD-10-CM

## 2025-05-06 LAB
ANION GAP SERPL CALC-SCNC: 5 MEQ/L (ref 2–13)
BUN SERPL-MCNC: 22 MG/DL (ref 7–20)
CALCIUM SERPL-MCNC: 9.5 MG/DL (ref 8.4–10.2)
CHLORIDE SERPL-SCNC: 109 MMOL/L (ref 98–110)
CO2 SERPL-SCNC: 24 MMOL/L (ref 21–32)
CREAT SERPL-MCNC: 1.16 MG/DL (ref 0.66–1.25)
CREAT/UREA NIT SERPL: 19 (ref 12–20)
GFR SERPLBLD CREATININE-BSD FMLA CKD-EPI: 54 ML/MIN/1.73/M2
GLUCOSE SERPL-MCNC: 169 MG/DL (ref 70–115)
POTASSIUM SERPL-SCNC: 4.3 MMOL/L (ref 3.5–5.1)
SODIUM SERPL-SCNC: 138 MMOL/L (ref 136–145)

## 2025-05-06 PROCEDURE — 99213 OFFICE O/P EST LOW 20 MIN: CPT | Mod: ,,, | Performed by: NURSE PRACTITIONER

## 2025-05-06 PROCEDURE — G2211 COMPLEX E/M VISIT ADD ON: HCPCS | Mod: ,,, | Performed by: NURSE PRACTITIONER

## 2025-05-06 PROCEDURE — 3075F SYST BP GE 130 - 139MM HG: CPT | Mod: CPTII,,, | Performed by: NURSE PRACTITIONER

## 2025-05-06 PROCEDURE — 80048 BASIC METABOLIC PNL TOTAL CA: CPT | Performed by: NURSE PRACTITIONER

## 2025-05-06 PROCEDURE — 3079F DIAST BP 80-89 MM HG: CPT | Mod: CPTII,,, | Performed by: NURSE PRACTITIONER

## 2025-05-06 PROCEDURE — 1159F MED LIST DOCD IN RCRD: CPT | Mod: CPTII,,, | Performed by: NURSE PRACTITIONER

## 2025-05-06 PROCEDURE — 1160F RVW MEDS BY RX/DR IN RCRD: CPT | Mod: CPTII,,, | Performed by: NURSE PRACTITIONER

## 2025-05-06 PROCEDURE — 3044F HG A1C LEVEL LT 7.0%: CPT | Mod: CPTII,,, | Performed by: NURSE PRACTITIONER

## 2025-05-06 PROCEDURE — 4010F ACE/ARB THERAPY RXD/TAKEN: CPT | Mod: CPTII,,, | Performed by: NURSE PRACTITIONER

## 2025-05-06 PROCEDURE — 3008F BODY MASS INDEX DOCD: CPT | Mod: CPTII,,, | Performed by: NURSE PRACTITIONER

## 2025-05-06 RX ORDER — BLOOD PRESSURE TEST KIT-WRIST
3 KIT MISCELLANEOUS DAILY
Qty: 90 CAPSULE | Refills: 2 | Status: SHIPPED | OUTPATIENT
Start: 2025-05-06

## 2025-05-06 NOTE — ASSESSMENT & PLAN NOTE
She was advised to take over-the-counter magnesium glycinate and return to clinic if cramping not resolved

## 2025-05-06 NOTE — ASSESSMENT & PLAN NOTE
Significant reduction in blood pressure, at/near goal.  Continue sodium restriction and current medications.  We will check potassium level today and if remains normal we will leave off of potassium.

## 2025-05-06 NOTE — PROGRESS NOTES
Patient ID: [id number]     Chief Complaint: Hypertension      Subjective     Ofelia Abad is a 59 y.o. female in the office for Hypertension  Her nifedipine was increased to 60 mg 2 weeks ago.  Today she reports that she realized what was causing her blood pressure to be elevated and it was the new seasoning she was using from [employer]..  She switch back to her 20/very and has not had any elevated readings since.  She denies any complaints other than cramping in toes.  Her potassium was stopped when she was started on spironolactone.  We will follow up on the potassium level today with a recheck.    Past Medical History:  has a past medical history of Diabetes mellitus, type 2, Fibroid, Heart murmur, Hyperlipidemia, Hypertension, Seasonal allergies, and Unspecified macular degeneration.    Social History:  reports that she has never smoked. She has never been exposed to tobacco smoke. She has never used smokeless tobacco. She reports that she does not currently use alcohol after a past usage of about 1.0 standard drink of alcohol per week. She reports that she does not use drugs.    Current Outpatient Medications   Medication Instructions    betamethasone valerate 0.1% (VALISONE) 0.1 % Crea Topical (Top), 2 times daily    blood sugar diagnostic Strp To check BG 1 times daily, to use with insurance preferred meter    blood-glucose meter kit To check BG 1 times daily, to use with insurance preferred meter    cetirizine (ZYRTEC) 10 mg, Oral, Daily    fluticasone propionate (FLONASE) 50 mcg, Each Nostril, As needed (PRN)    irbesartan (AVAPRO) 300 mg, Oral, Daily    JARDIANCE 25 mg, Oral    ketorolac 0.5% (ACULAR) 0.5 % Drop 1 drop, 2 times daily    labetaloL (NORMODYNE) 200 mg, Oral, 2 times daily    lancets Misc To check BG 1 times daily, to use with insurance preferred meter    magnesium glycinate 100 mg magnesium Cap 3 capsules, Oral, Daily    NIFEdipine (PROCARDIA-XL) 60 mg, Oral, Daily    OZEMPIC 0.5 mg,  "Subcutaneous, Every 7 days    potassium chloride SA (K-DUR,KLOR-CON) 20 MEQ tablet 20 mEq, Oral, Daily    rosuvastatin (CRESTOR) 10 mg, Oral, Nightly    spironolactone (ALDACTONE) 25 mg, Oral, Daily    temazepam (RESTORIL) 15 mg, Oral, Nightly PRN       Patient is allergic to norvasc [amlodipine].     Patient Care Team:  Dina Dexter FNP-C as PCP - General (Family Medicine)  [facility], [facility]  Pinky Xavier MD as Consulting Physician (Obstetrics and Gynecology)     Objective     Visit Vitals  /80 (BP Location: Left arm, Patient Position: Sitting)   Pulse 76   Temp 97.6 °F (36.4 °C) (Temporal)   Ht 5' 0.98" (1.549 m)   Wt 68.6 kg (151 lb 3.2 oz)   SpO2 100%   BMI 28.58 kg/m²       Physical Exam  Vitals reviewed.   Constitutional:       General: She is not in acute distress.     Appearance: She is not ill-appearing.   HENT:      Head: Normocephalic.   Cardiovascular:      Rate and Rhythm: Normal rate and regular rhythm.   Pulmonary:      Effort: Pulmonary effort is normal.      Breath sounds: Normal breath sounds.   Musculoskeletal:         General: Normal range of motion.      Cervical back: Normal range of motion and neck supple.   Skin:     General: Skin is warm and dry.   Neurological:      Mental Status: She is alert and oriented to person, place, and time. Mental status is at baseline.   Psychiatric:         Mood and Affect: Mood normal.         Behavior: Behavior normal.         Assessment & Plan     1. Primary hypertension  Overview:  On irbesartan, Labetalol 200 bid, nifedipine 60 mg  08/2024: d/c'd HCTZ (did not tolerate)    Assessment & Plan:  Significant reduction in blood pressure, at/near goal.  Continue sodium restriction and current medications.  We will check potassium level today and if remains normal we will leave off of potassium.    Orders:  -     Basic Metabolic Panel; Future; Expected date: 05/06/2025  -     Basic Metabolic Panel    2. Cramping of feet  Assessment & " Plan:  She was advised to take over-the-counter magnesium glycinate and return to clinic if cramping not resolved      Other orders  -     magnesium glycinate 100 mg magnesium Cap; Take 3 capsules by mouth Daily.  Dispense: 90 capsule; Refill: 2       Trial of magnesium glycinate 300 mg daily.    Follow up for 1), 4 mo, Routine, non-fasting labs prior. In addition to their next scheduled appointment, the patient has also been instructed to follow up on as needed basis.     Future Appointments   Date Time Provider Department Center   6/18/2025  8:40 AM LAB, [facility] LABORATORY DRAW STATION [facility] MICKEY [facility]   6/24/2025  8:00 [facility]   9/2/2025  9:30 AM LAB, [facility] LABORATORY DRAW STATION [facility] MICKEY [facility]   9/9/2025  8:30 [facility]        Lab Frequency Next Occurrence   Ambulatory referral/consult to Podiatry Once 02/19/2025   CBC Auto Differential Once 06/12/2025   Comprehensive Metabolic Panel Once 06/12/2025   Lipid Panel Once 06/12/2025   Hemoglobin A1C Once 06/12/2025   TSH Once 06/12/2025   Basic Metabolic Panel Once 04/22/2025       This note was generated with the assistance of ambient listening technology. Verbal consent was obtained by the patient and accompanying visitor(s) for the recording of patient appointment to facilitate this note. I attest to having reviewed and edited the generated note for accuracy, though some syntax or spelling errors may persist. Please contact the author of this note for any clarification.

## 2025-05-07 ENCOUNTER — TELEPHONE (OUTPATIENT)
Dept: FAMILY MEDICINE | Facility: CLINIC | Age: 60
End: 2025-05-07
Payer: MEDICAID

## 2025-05-07 NOTE — TELEPHONE ENCOUNTER
----- Message from CE Richardson sent at 5/6/2025  4:50 PM CDT -----  Results are within acceptable ranges for age and conditions. No change in treatment needed at this time. Follow up as scheduled.   ----- Message -----  From: Lab, Background User  Sent: 5/6/2025   3:47 PM CDT  To: CE Jimenez

## 2025-06-17 PROCEDURE — 80053 COMPREHEN METABOLIC PANEL: CPT | Performed by: NURSE PRACTITIONER

## 2025-06-17 PROCEDURE — 80061 LIPID PANEL: CPT | Performed by: NURSE PRACTITIONER

## 2025-06-17 PROCEDURE — 84443 ASSAY THYROID STIM HORMONE: CPT | Performed by: NURSE PRACTITIONER

## 2025-06-17 PROCEDURE — 85025 COMPLETE CBC W/AUTO DIFF WBC: CPT | Performed by: NURSE PRACTITIONER

## 2025-06-17 PROCEDURE — 83036 HEMOGLOBIN GLYCOSYLATED A1C: CPT | Performed by: NURSE PRACTITIONER

## 2025-06-24 ENCOUNTER — OFFICE VISIT (OUTPATIENT)
Dept: FAMILY MEDICINE | Facility: CLINIC | Age: 60
End: 2025-06-24
Payer: MEDICAID

## 2025-06-24 VITALS
DIASTOLIC BLOOD PRESSURE: 86 MMHG | WEIGHT: 148.63 LBS | HEIGHT: 61 IN | BODY MASS INDEX: 28.06 KG/M2 | OXYGEN SATURATION: 97 % | TEMPERATURE: 98 F | HEART RATE: 65 BPM | SYSTOLIC BLOOD PRESSURE: 138 MMHG

## 2025-06-24 DIAGNOSIS — H35.30 MACULAR DEGENERATION, UNSPECIFIED LATERALITY, UNSPECIFIED TYPE: ICD-10-CM

## 2025-06-24 DIAGNOSIS — J30.9 ALLERGIC RHINITIS, UNSPECIFIED SEASONALITY, UNSPECIFIED TRIGGER: ICD-10-CM

## 2025-06-24 DIAGNOSIS — Z12.39 ENCOUNTER FOR SCREENING FOR MALIGNANT NEOPLASM OF BREAST, UNSPECIFIED SCREENING MODALITY: ICD-10-CM

## 2025-06-24 DIAGNOSIS — I10 PRIMARY HYPERTENSION: ICD-10-CM

## 2025-06-24 DIAGNOSIS — E78.2 MIXED HYPERLIPIDEMIA: ICD-10-CM

## 2025-06-24 DIAGNOSIS — R25.2 CRAMPING OF FEET: ICD-10-CM

## 2025-06-24 DIAGNOSIS — L30.9 ECZEMA, UNSPECIFIED TYPE: ICD-10-CM

## 2025-06-24 DIAGNOSIS — Z00.00 ENCOUNTER FOR ROUTINE ADULT HEALTH EXAMINATION WITHOUT ABNORMAL FINDINGS: Primary | ICD-10-CM

## 2025-06-24 DIAGNOSIS — E11.9 TYPE 2 DIABETES MELLITUS WITHOUT COMPLICATION, WITHOUT LONG-TERM CURRENT USE OF INSULIN: ICD-10-CM

## 2025-06-24 PROBLEM — R60.0 LOCALIZED EDEMA: Status: RESOLVED | Noted: 2025-04-15 | Resolved: 2025-06-24

## 2025-06-24 PROCEDURE — 1160F RVW MEDS BY RX/DR IN RCRD: CPT | Mod: CPTII,,, | Performed by: NURSE PRACTITIONER

## 2025-06-24 PROCEDURE — 4010F ACE/ARB THERAPY RXD/TAKEN: CPT | Mod: CPTII,,, | Performed by: NURSE PRACTITIONER

## 2025-06-24 PROCEDURE — 82043 UR ALBUMIN QUANTITATIVE: CPT | Performed by: NURSE PRACTITIONER

## 2025-06-24 PROCEDURE — 99396 PREV VISIT EST AGE 40-64: CPT | Mod: ,,, | Performed by: NURSE PRACTITIONER

## 2025-06-24 PROCEDURE — 3079F DIAST BP 80-89 MM HG: CPT | Mod: CPTII,,, | Performed by: NURSE PRACTITIONER

## 2025-06-24 PROCEDURE — 3008F BODY MASS INDEX DOCD: CPT | Mod: CPTII,,, | Performed by: NURSE PRACTITIONER

## 2025-06-24 PROCEDURE — 3075F SYST BP GE 130 - 139MM HG: CPT | Mod: CPTII,,, | Performed by: NURSE PRACTITIONER

## 2025-06-24 PROCEDURE — 1159F MED LIST DOCD IN RCRD: CPT | Mod: CPTII,,, | Performed by: NURSE PRACTITIONER

## 2025-06-24 PROCEDURE — 3044F HG A1C LEVEL LT 7.0%: CPT | Mod: CPTII,,, | Performed by: NURSE PRACTITIONER

## 2025-06-24 RX ORDER — TERBINAFINE HYDROCHLORIDE 250 MG/1
250 TABLET ORAL
COMMUNITY
Start: 2025-06-04

## 2025-06-24 RX ORDER — NIFEDIPINE 60 MG/1
60 TABLET, EXTENDED RELEASE ORAL DAILY
Qty: 90 TABLET | Refills: 3 | Status: SHIPPED | OUTPATIENT
Start: 2025-06-24

## 2025-06-24 NOTE — ASSESSMENT & PLAN NOTE
Lab Results   Component Value Date    HGBA1C 6.3 (H) 06/17/2025      Continue medications without change.   Encouraged to follow ADA diet. Avoid soda, sweets, and limit rice/pasta/breads/starches (no more than 45-50 grams per meal).   Encouraged to implement exercise into daily regimen.  Encouraged compliance with both medications and diet to limit long term and negative effects from the disease.    Monitory glucose levels each morning and record.  Please bring for review at next appointment.  Stressed importance of daily foot exams and annual dilated eye exams.

## 2025-06-24 NOTE — LETTER
AUTHORIZATION FOR RELEASE OF   CONFIDENTIAL INFORMATION    [facility],    We are seeing Ofelia Abad, date of birth 1965, in the clinic at [facility]. Dina Dexter FNP-C is the patient's PCP. Ofelia Abad has an outstanding lab/procedure at the time we reviewed her chart. In order to help keep her health information updated, she has authorized us to request the following medical record(s):        (  )  MAMMOGRAM                                      (  )  COLONOSCOPY      (  )  PAP SMEAR                                          (  )  OUTSIDE LAB RESULTS     (  )  DEXA SCAN                                          (X) DM  EYE EXAM            (  )  FOOT EXAM                                          (  )  ENTIRE RECORD     (  )  OUTSIDE IMMUNIZATIONS                 (  )  _______________         Please fax records to Dina Dexter FNP-C, [phone]     If you have any questions, please contact Edna at [phone].           Patient Name: Ofelia Abad  : 1965  Patient Phone #: [phone]

## 2025-06-25 LAB
ALBUMIN/CREAT UR: 35.6 MG/GM CR (ref 0–30)
CREAT UR-MCNC: 114.7 MG/DL (ref 45–106)
MICROALBUMIN UR-MCNC: 40.8 UG/ML

## 2025-07-06 DIAGNOSIS — I10 PRIMARY HYPERTENSION: ICD-10-CM

## 2025-07-07 RX ORDER — SPIRONOLACTONE 25 MG/1
25 TABLET ORAL
Qty: 30 TABLET | Refills: 5 | Status: SHIPPED | OUTPATIENT
Start: 2025-07-07

## 2025-07-17 ENCOUNTER — TELEPHONE (OUTPATIENT)
Dept: FAMILY MEDICINE | Facility: CLINIC | Age: 60
End: 2025-07-17
Payer: MEDICAID

## 2025-07-17 DIAGNOSIS — I10 PRIMARY HYPERTENSION: ICD-10-CM

## 2025-07-17 RX ORDER — LABETALOL 200 MG/1
200 TABLET, FILM COATED ORAL 2 TIMES DAILY
Qty: 60 TABLET | Refills: 5 | Status: SHIPPED | OUTPATIENT
Start: 2025-07-17

## 2025-07-31 DIAGNOSIS — G47.00 INSOMNIA, UNSPECIFIED TYPE: ICD-10-CM

## 2025-08-04 RX ORDER — TEMAZEPAM 15 MG/1
CAPSULE ORAL
Qty: 30 CAPSULE | Refills: 0 | Status: SHIPPED | OUTPATIENT
Start: 2025-08-04

## 2025-08-21 ENCOUNTER — OFFICE VISIT (OUTPATIENT)
Dept: FAMILY MEDICINE | Facility: CLINIC | Age: 60
End: 2025-08-21
Payer: MEDICAID

## 2025-08-21 ENCOUNTER — TELEPHONE (OUTPATIENT)
Dept: FAMILY MEDICINE | Facility: CLINIC | Age: 60
End: 2025-08-21

## 2025-08-21 VITALS
DIASTOLIC BLOOD PRESSURE: 78 MMHG | WEIGHT: 156.19 LBS | HEART RATE: 82 BPM | SYSTOLIC BLOOD PRESSURE: 138 MMHG | HEIGHT: 61 IN | BODY MASS INDEX: 29.49 KG/M2 | OXYGEN SATURATION: 98 % | TEMPERATURE: 97 F

## 2025-08-21 DIAGNOSIS — E11.319 TYPE 2 DIABETES MELLITUS WITH RETINOPATHY, WITHOUT LONG-TERM CURRENT USE OF INSULIN, MACULAR EDEMA PRESENCE UNSPECIFIED, UNSPECIFIED LATERALITY, UNSPECIFIED RETINOPATHY SEVERITY: ICD-10-CM

## 2025-08-21 DIAGNOSIS — L30.9 ECZEMA, UNSPECIFIED TYPE: Primary | ICD-10-CM

## 2025-08-21 DIAGNOSIS — I10 PRIMARY HYPERTENSION: ICD-10-CM

## 2025-08-21 PROCEDURE — 3044F HG A1C LEVEL LT 7.0%: CPT | Mod: CPTII,,, | Performed by: NURSE PRACTITIONER

## 2025-08-21 PROCEDURE — 1159F MED LIST DOCD IN RCRD: CPT | Mod: CPTII,,, | Performed by: NURSE PRACTITIONER

## 2025-08-21 PROCEDURE — 3078F DIAST BP <80 MM HG: CPT | Mod: CPTII,,, | Performed by: NURSE PRACTITIONER

## 2025-08-21 PROCEDURE — 4010F ACE/ARB THERAPY RXD/TAKEN: CPT | Mod: CPTII,,, | Performed by: NURSE PRACTITIONER

## 2025-08-21 PROCEDURE — 3075F SYST BP GE 130 - 139MM HG: CPT | Mod: CPTII,,, | Performed by: NURSE PRACTITIONER

## 2025-08-21 PROCEDURE — 99213 OFFICE O/P EST LOW 20 MIN: CPT | Mod: ,,, | Performed by: NURSE PRACTITIONER

## 2025-08-21 PROCEDURE — 3008F BODY MASS INDEX DOCD: CPT | Mod: CPTII,,, | Performed by: NURSE PRACTITIONER

## 2025-08-21 PROCEDURE — 3066F NEPHROPATHY DOC TX: CPT | Mod: CPTII,,, | Performed by: NURSE PRACTITIONER

## 2025-08-21 PROCEDURE — G2211 COMPLEX E/M VISIT ADD ON: HCPCS | Mod: ,,, | Performed by: NURSE PRACTITIONER

## 2025-08-21 PROCEDURE — 3060F POS MICROALBUMINURIA REV: CPT | Mod: CPTII,,, | Performed by: NURSE PRACTITIONER

## 2025-08-21 PROCEDURE — 1160F RVW MEDS BY RX/DR IN RCRD: CPT | Mod: CPTII,,, | Performed by: NURSE PRACTITIONER

## 2025-08-21 RX ORDER — BETAMETHASONE VALERATE 1 MG/G
CREAM TOPICAL 2 TIMES DAILY
Qty: 15 G | Refills: 1 | Status: SHIPPED | OUTPATIENT
Start: 2025-08-21

## 2025-08-21 RX ORDER — IRBESARTAN 150 MG/1
300 TABLET ORAL DAILY
Qty: 180 TABLET | Refills: 3 | Status: SHIPPED | OUTPATIENT
Start: 2025-08-21